# Patient Record
Sex: MALE | Race: WHITE | NOT HISPANIC OR LATINO | URBAN - METROPOLITAN AREA
[De-identification: names, ages, dates, MRNs, and addresses within clinical notes are randomized per-mention and may not be internally consistent; named-entity substitution may affect disease eponyms.]

---

## 2020-11-20 ENCOUNTER — NURSE TRIAGE (OUTPATIENT)
Dept: ADMINISTRATIVE | Facility: CLINIC | Age: 60
End: 2020-11-20

## 2020-11-21 ENCOUNTER — HOSPITAL ENCOUNTER (INPATIENT)
Facility: HOSPITAL | Age: 60
LOS: 2 days | Discharge: HOME | DRG: 177 | End: 2020-11-24
Attending: EMERGENCY MEDICINE | Admitting: INTERNAL MEDICINE
Payer: COMMERCIAL

## 2020-11-21 DIAGNOSIS — Z20.822 SUSPECTED COVID-19 VIRUS INFECTION: ICD-10-CM

## 2020-11-21 DIAGNOSIS — R00.1 BRADYCARDIA: ICD-10-CM

## 2020-11-21 DIAGNOSIS — J96.01 ACUTE HYPOXEMIC RESPIRATORY FAILURE: ICD-10-CM

## 2020-11-21 DIAGNOSIS — U07.1 PNEUMONIA DUE TO COVID-19 VIRUS: Primary | ICD-10-CM

## 2020-11-21 DIAGNOSIS — J12.82 PNEUMONIA DUE TO COVID-19 VIRUS: Primary | ICD-10-CM

## 2020-11-21 PROBLEM — E11.9 TYPE 2 DIABETES MELLITUS WITHOUT COMPLICATION: Status: ACTIVE | Noted: 2020-11-21

## 2020-11-21 PROBLEM — I10 HYPERTENSION: Status: ACTIVE | Noted: 2020-11-21

## 2020-11-21 PROBLEM — I10 ESSENTIAL HYPERTENSION: Chronic | Status: ACTIVE | Noted: 2020-11-21

## 2020-11-21 PROBLEM — M10.9 GOUT: Status: ACTIVE | Noted: 2020-11-21

## 2020-11-21 PROBLEM — M19.90 ARTHRITIS: Status: ACTIVE | Noted: 2020-11-21

## 2020-11-21 PROBLEM — E11.9 TYPE 2 DIABETES MELLITUS WITHOUT COMPLICATION, WITHOUT LONG-TERM CURRENT USE OF INSULIN: Chronic | Status: ACTIVE | Noted: 2020-11-21

## 2020-11-21 PROBLEM — E11.65 TYPE 2 DIABETES MELLITUS WITH HYPERGLYCEMIA, WITHOUT LONG-TERM CURRENT USE OF INSULIN: Chronic | Status: ACTIVE | Noted: 2020-11-21

## 2020-11-21 LAB
ALBUMIN SERPL BCP-MCNC: 2.4 G/DL (ref 3.5–5.2)
ALLENS TEST: ABNORMAL
ALP SERPL-CCNC: 104 U/L (ref 55–135)
ALT SERPL W/O P-5'-P-CCNC: 123 U/L (ref 10–44)
ANION GAP SERPL CALC-SCNC: 12 MMOL/L (ref 8–16)
AST SERPL-CCNC: 79 U/L (ref 10–40)
BASOPHILS # BLD AUTO: 0 K/UL (ref 0–0.2)
BASOPHILS NFR BLD: 0 % (ref 0–1.9)
BILIRUB SERPL-MCNC: 0.5 MG/DL (ref 0.1–1)
BUN SERPL-MCNC: 20 MG/DL (ref 8–23)
CALCIUM SERPL-MCNC: 8.4 MG/DL (ref 8.7–10.5)
CHLORIDE SERPL-SCNC: 101 MMOL/L (ref 95–110)
CK SERPL-CCNC: 68 U/L (ref 20–200)
CO2 SERPL-SCNC: 24 MMOL/L (ref 23–29)
CREAT SERPL-MCNC: 1 MG/DL (ref 0.5–1.4)
CRP SERPL-MCNC: 133.7 MG/L (ref 0–8.2)
D DIMER PPP IA.FEU-MCNC: 0.69 MG/L FEU
DELSYS: ABNORMAL
DIFFERENTIAL METHOD: ABNORMAL
EOSINOPHIL # BLD AUTO: 0 K/UL (ref 0–0.5)
EOSINOPHIL NFR BLD: 0 % (ref 0–8)
ERYTHROCYTE [DISTWIDTH] IN BLOOD BY AUTOMATED COUNT: 12 % (ref 11.5–14.5)
ERYTHROCYTE [SEDIMENTATION RATE] IN BLOOD BY WESTERGREN METHOD: 120 MM/HR (ref 0–10)
EST. GFR  (AFRICAN AMERICAN): >60 ML/MIN/1.73 M^2
EST. GFR  (NON AFRICAN AMERICAN): >60 ML/MIN/1.73 M^2
FIO2: 21
GLUCOSE SERPL-MCNC: 336 MG/DL (ref 70–110)
HCO3 UR-SCNC: 23.6 MMOL/L (ref 24–28)
HCT VFR BLD AUTO: 35.4 % (ref 40–54)
HGB BLD-MCNC: 12.1 G/DL (ref 14–18)
IMM GRANULOCYTES # BLD AUTO: 0.05 K/UL (ref 0–0.04)
IMM GRANULOCYTES NFR BLD AUTO: 0.6 % (ref 0–0.5)
LACTATE SERPL-SCNC: 1.5 MMOL/L (ref 0.5–2.2)
LACTATE SERPL-SCNC: 2.3 MMOL/L (ref 0.5–2.2)
LDH SERPL L TO P-CCNC: 204 U/L (ref 110–260)
LDH SERPL L TO P-CCNC: 267 U/L (ref 110–260)
LYMPHOCYTES # BLD AUTO: 0.8 K/UL (ref 1–4.8)
LYMPHOCYTES NFR BLD: 8.7 % (ref 18–48)
MCH RBC QN AUTO: 32.5 PG (ref 27–31)
MCHC RBC AUTO-ENTMCNC: 34.2 G/DL (ref 32–36)
MCV RBC AUTO: 95 FL (ref 82–98)
MODE: ABNORMAL
MONOCYTES # BLD AUTO: 0.8 K/UL (ref 0.3–1)
MONOCYTES NFR BLD: 8.6 % (ref 4–15)
NEUTROPHILS # BLD AUTO: 7.3 K/UL (ref 1.8–7.7)
NEUTROPHILS NFR BLD: 82.1 % (ref 38–73)
NRBC BLD-RTO: 0 /100 WBC
PCO2 BLDA: 33.9 MMHG (ref 35–45)
PH SMN: 7.45 [PH] (ref 7.35–7.45)
PLATELET # BLD AUTO: 292 K/UL (ref 150–350)
PMV BLD AUTO: 9.2 FL (ref 9.2–12.9)
PO2 BLDA: 60 MMHG (ref 80–100)
POC BE: 0 MMOL/L
POC SATURATED O2: 92 % (ref 95–100)
POTASSIUM SERPL-SCNC: 4 MMOL/L (ref 3.5–5.1)
PROCALCITONIN SERPL IA-MCNC: 0.13 NG/ML
PROT SERPL-MCNC: 7 G/DL (ref 6–8.4)
RBC # BLD AUTO: 3.72 M/UL (ref 4.6–6.2)
SAMPLE: ABNORMAL
SITE: ABNORMAL
SODIUM SERPL-SCNC: 137 MMOL/L (ref 136–145)
TROPONIN I SERPL DL<=0.01 NG/ML-MCNC: 0.01 NG/ML (ref 0–0.03)
WBC # BLD AUTO: 8.86 K/UL (ref 3.9–12.7)

## 2020-11-21 PROCEDURE — 83605 ASSAY OF LACTIC ACID: CPT | Mod: ER

## 2020-11-21 PROCEDURE — 36600 WITHDRAWAL OF ARTERIAL BLOOD: CPT | Mod: ER

## 2020-11-21 PROCEDURE — 82728 ASSAY OF FERRITIN: CPT | Mod: 91

## 2020-11-21 PROCEDURE — 82728 ASSAY OF FERRITIN: CPT

## 2020-11-21 PROCEDURE — 93010 EKG 12-LEAD: ICD-10-PCS | Mod: ,,, | Performed by: INTERNAL MEDICINE

## 2020-11-21 PROCEDURE — 94761 N-INVAS EAR/PLS OXIMETRY MLT: CPT

## 2020-11-21 PROCEDURE — 36415 COLL VENOUS BLD VENIPUNCTURE: CPT

## 2020-11-21 PROCEDURE — 82550 ASSAY OF CK (CPK): CPT | Mod: ER

## 2020-11-21 PROCEDURE — 84484 ASSAY OF TROPONIN QUANT: CPT | Mod: ER

## 2020-11-21 PROCEDURE — 83615 LACTATE (LD) (LDH) ENZYME: CPT | Mod: ER

## 2020-11-21 PROCEDURE — G0378 HOSPITAL OBSERVATION PER HR: HCPCS | Mod: ER

## 2020-11-21 PROCEDURE — 25000003 PHARM REV CODE 250: Performed by: INTERNAL MEDICINE

## 2020-11-21 PROCEDURE — 93005 ELECTROCARDIOGRAM TRACING: CPT | Mod: ER

## 2020-11-21 PROCEDURE — 94640 AIRWAY INHALATION TREATMENT: CPT

## 2020-11-21 PROCEDURE — 87040 BLOOD CULTURE FOR BACTERIA: CPT | Mod: 59

## 2020-11-21 PROCEDURE — 63600175 PHARM REV CODE 636 W HCPCS: Performed by: INTERNAL MEDICINE

## 2020-11-21 PROCEDURE — 83605 ASSAY OF LACTIC ACID: CPT | Mod: 91

## 2020-11-21 PROCEDURE — 99900035 HC TECH TIME PER 15 MIN (STAT)

## 2020-11-21 PROCEDURE — 93010 ELECTROCARDIOGRAM REPORT: CPT | Mod: ,,, | Performed by: INTERNAL MEDICINE

## 2020-11-21 PROCEDURE — 99900035 HC TECH TIME PER 15 MIN (STAT): Mod: ER

## 2020-11-21 PROCEDURE — 82803 BLOOD GASES ANY COMBINATION: CPT | Mod: ER

## 2020-11-21 PROCEDURE — 25000003 PHARM REV CODE 250: Performed by: EMERGENCY MEDICINE

## 2020-11-21 PROCEDURE — 99291 CRITICAL CARE FIRST HOUR: CPT | Mod: 25,ER

## 2020-11-21 PROCEDURE — G0378 HOSPITAL OBSERVATION PER HR: HCPCS

## 2020-11-21 PROCEDURE — 96374 THER/PROPH/DIAG INJ IV PUSH: CPT | Mod: ER

## 2020-11-21 PROCEDURE — 25000242 PHARM REV CODE 250 ALT 637 W/ HCPCS: Performed by: INTERNAL MEDICINE

## 2020-11-21 PROCEDURE — 80053 COMPREHEN METABOLIC PANEL: CPT | Mod: ER

## 2020-11-21 PROCEDURE — 96372 THER/PROPH/DIAG INJ SC/IM: CPT

## 2020-11-21 PROCEDURE — 63600175 PHARM REV CODE 636 W HCPCS: Mod: ER | Performed by: EMERGENCY MEDICINE

## 2020-11-21 PROCEDURE — 85379 FIBRIN DEGRADATION QUANT: CPT

## 2020-11-21 PROCEDURE — 82306 VITAMIN D 25 HYDROXY: CPT

## 2020-11-21 PROCEDURE — 83615 LACTATE (LD) (LDH) ENZYME: CPT | Mod: 91

## 2020-11-21 PROCEDURE — 94799 UNLISTED PULMONARY SVC/PX: CPT

## 2020-11-21 PROCEDURE — 84145 PROCALCITONIN (PCT): CPT

## 2020-11-21 PROCEDURE — 85025 COMPLETE CBC W/AUTO DIFF WBC: CPT | Mod: ER

## 2020-11-21 PROCEDURE — 85651 RBC SED RATE NONAUTOMATED: CPT

## 2020-11-21 PROCEDURE — 86140 C-REACTIVE PROTEIN: CPT | Mod: ER

## 2020-11-21 RX ORDER — METFORMIN HYDROCHLORIDE 1000 MG/1
1000 TABLET ORAL
COMMUNITY
Start: 2020-02-27

## 2020-11-21 RX ORDER — LANOLIN ALCOHOL/MO/W.PET/CERES
1000 CREAM (GRAM) TOPICAL
COMMUNITY

## 2020-11-21 RX ORDER — ATORVASTATIN CALCIUM 20 MG/1
20 TABLET, FILM COATED ORAL
COMMUNITY
Start: 2019-12-12

## 2020-11-21 RX ORDER — DEXAMETHASONE SODIUM PHOSPHATE 4 MG/ML
6 INJECTION, SOLUTION INTRA-ARTICULAR; INTRALESIONAL; INTRAMUSCULAR; INTRAVENOUS; SOFT TISSUE
Status: COMPLETED | OUTPATIENT
Start: 2020-11-21 | End: 2020-11-21

## 2020-11-21 RX ORDER — GLUCAGON 1 MG
1 KIT INJECTION
Status: DISCONTINUED | OUTPATIENT
Start: 2020-11-21 | End: 2020-11-23

## 2020-11-21 RX ORDER — SODIUM CHLORIDE 0.9 % (FLUSH) 0.9 %
10 SYRINGE (ML) INJECTION
Status: DISCONTINUED | OUTPATIENT
Start: 2020-11-21 | End: 2020-11-24 | Stop reason: HOSPADM

## 2020-11-21 RX ORDER — ALLOPURINOL 300 MG/1
300 TABLET ORAL
COMMUNITY
Start: 2019-12-30 | End: 2020-12-29

## 2020-11-21 RX ORDER — TALC
6 POWDER (GRAM) TOPICAL NIGHTLY PRN
Status: DISCONTINUED | OUTPATIENT
Start: 2020-11-21 | End: 2020-11-22

## 2020-11-21 RX ORDER — ASCORBIC ACID 500 MG
500 TABLET ORAL 2 TIMES DAILY
Status: DISCONTINUED | OUTPATIENT
Start: 2020-11-21 | End: 2020-11-24 | Stop reason: HOSPADM

## 2020-11-21 RX ORDER — ATORVASTATIN CALCIUM 10 MG/1
20 TABLET, FILM COATED ORAL DAILY
Status: DISCONTINUED | OUTPATIENT
Start: 2020-11-22 | End: 2020-11-21

## 2020-11-21 RX ORDER — ALPRAZOLAM 0.25 MG/1
0.25 TABLET ORAL 2 TIMES DAILY PRN
Status: DISCONTINUED | OUTPATIENT
Start: 2020-11-21 | End: 2020-11-24 | Stop reason: HOSPADM

## 2020-11-21 RX ORDER — INSULIN ASPART 100 [IU]/ML
0-5 INJECTION, SOLUTION INTRAVENOUS; SUBCUTANEOUS EVERY 6 HOURS PRN
Status: DISCONTINUED | OUTPATIENT
Start: 2020-11-22 | End: 2020-11-23

## 2020-11-21 RX ORDER — DIPHENHYDRAMINE HCL 25 MG
25 CAPSULE ORAL EVERY 6 HOURS PRN
Status: DISCONTINUED | OUTPATIENT
Start: 2020-11-21 | End: 2020-11-24 | Stop reason: HOSPADM

## 2020-11-21 RX ORDER — GUAIFENESIN/DEXTROMETHORPHAN 100-10MG/5
10 SYRUP ORAL EVERY 4 HOURS PRN
Status: DISCONTINUED | OUTPATIENT
Start: 2020-11-21 | End: 2020-11-24 | Stop reason: HOSPADM

## 2020-11-21 RX ORDER — IBUPROFEN 200 MG
24 TABLET ORAL
Status: DISCONTINUED | OUTPATIENT
Start: 2020-11-21 | End: 2020-11-23

## 2020-11-21 RX ORDER — ACETAMINOPHEN 325 MG/1
650 TABLET ORAL EVERY 6 HOURS PRN
Status: DISCONTINUED | OUTPATIENT
Start: 2020-11-21 | End: 2020-11-21

## 2020-11-21 RX ORDER — IBUPROFEN 400 MG/1
400 TABLET ORAL EVERY 6 HOURS PRN
Status: DISCONTINUED | OUTPATIENT
Start: 2020-11-22 | End: 2020-11-24 | Stop reason: HOSPADM

## 2020-11-21 RX ORDER — ENOXAPARIN SODIUM 100 MG/ML
40 INJECTION SUBCUTANEOUS EVERY 24 HOURS
Status: DISCONTINUED | OUTPATIENT
Start: 2020-11-21 | End: 2020-11-24 | Stop reason: HOSPADM

## 2020-11-21 RX ORDER — IBUPROFEN 200 MG
16 TABLET ORAL
Status: DISCONTINUED | OUTPATIENT
Start: 2020-11-21 | End: 2020-11-23

## 2020-11-21 RX ORDER — ALBUTEROL SULFATE 90 UG/1
2 AEROSOL, METERED RESPIRATORY (INHALATION) EVERY 6 HOURS
Status: DISCONTINUED | OUTPATIENT
Start: 2020-11-21 | End: 2020-11-24 | Stop reason: HOSPADM

## 2020-11-21 RX ORDER — BISACODYL 10 MG
10 SUPPOSITORY, RECTAL RECTAL DAILY PRN
Status: DISCONTINUED | OUTPATIENT
Start: 2020-11-21 | End: 2020-11-24 | Stop reason: HOSPADM

## 2020-11-21 RX ORDER — ALPRAZOLAM 0.25 MG/1
0.25 TABLET ORAL
COMMUNITY
Start: 2020-11-19

## 2020-11-21 RX ORDER — CELECOXIB 200 MG/1
200 CAPSULE ORAL
COMMUNITY
Start: 2020-11-20

## 2020-11-21 RX ORDER — CELECOXIB 100 MG/1
200 CAPSULE ORAL DAILY
Status: DISCONTINUED | OUTPATIENT
Start: 2020-11-22 | End: 2020-11-24 | Stop reason: HOSPADM

## 2020-11-21 RX ORDER — ZOLPIDEM TARTRATE 10 MG/1
10 TABLET ORAL
COMMUNITY
Start: 2020-11-19

## 2020-11-21 RX ORDER — INSULIN ASPART 100 [IU]/ML
0-5 INJECTION, SOLUTION INTRAVENOUS; SUBCUTANEOUS
Status: DISCONTINUED | OUTPATIENT
Start: 2020-11-21 | End: 2020-11-21

## 2020-11-21 RX ORDER — CHOLECALCIFEROL (VITAMIN D3) 25 MCG
1000 TABLET ORAL DAILY
Status: DISCONTINUED | OUTPATIENT
Start: 2020-11-22 | End: 2020-11-24 | Stop reason: HOSPADM

## 2020-11-21 RX ORDER — ONDANSETRON 2 MG/ML
4 INJECTION INTRAMUSCULAR; INTRAVENOUS EVERY 8 HOURS PRN
Status: DISCONTINUED | OUTPATIENT
Start: 2020-11-21 | End: 2020-11-24 | Stop reason: HOSPADM

## 2020-11-21 RX ORDER — ALLOPURINOL 300 MG/1
300 TABLET ORAL DAILY
Status: DISCONTINUED | OUTPATIENT
Start: 2020-11-22 | End: 2020-11-24 | Stop reason: HOSPADM

## 2020-11-21 RX ORDER — CYANOCOBALAMIN (VITAMIN B-12) 250 MCG
1000 TABLET ORAL DAILY
Status: DISCONTINUED | OUTPATIENT
Start: 2020-11-22 | End: 2020-11-24 | Stop reason: HOSPADM

## 2020-11-21 RX ORDER — ZINC SULFATE 50(220)MG
220 CAPSULE ORAL DAILY
Status: DISCONTINUED | OUTPATIENT
Start: 2020-11-22 | End: 2020-11-24 | Stop reason: HOSPADM

## 2020-11-21 RX ADMIN — OXYCODONE HYDROCHLORIDE AND ACETAMINOPHEN 500 MG: 500 TABLET ORAL at 09:11

## 2020-11-21 RX ADMIN — Medication 6 MG: at 10:11

## 2020-11-21 RX ADMIN — ALPRAZOLAM 0.25 MG: 0.25 TABLET ORAL at 10:11

## 2020-11-21 RX ADMIN — ALBUTEROL SULFATE 2 PUFF: 90 AEROSOL, METERED RESPIRATORY (INHALATION) at 10:11

## 2020-11-21 RX ADMIN — DEXAMETHASONE SODIUM PHOSPHATE 6 MG: 4 INJECTION, SOLUTION INTRAMUSCULAR; INTRAVENOUS at 03:11

## 2020-11-21 RX ADMIN — ENOXAPARIN SODIUM 40 MG: 100 INJECTION SUBCUTANEOUS at 09:11

## 2020-11-21 NOTE — ED PROVIDER NOTES
Encounter Date: 11/21/2020       History     Chief Complaint   Patient presents with    Shortness of Breath     sob worse today and low sat at home in 70's . hx covid     61 y/o M with PMH of HTN, DM, HLD here with c/o SOB that has been progressively worsening over the past 6 days, and is now severe. Patient was dx with COVID 3 days ago, and has been taking prednisone, and using a home pulse ox. Today, he was having trouble speaking, and had sats in the 70's at home. He is feeling better on our oxygen here.         Review of patient's allergies indicates:  No Known Allergies  Past Medical History:   Diagnosis Date    Diabetes mellitus     High cholesterol     Hypertension      History reviewed. No pertinent surgical history.  History reviewed. No pertinent family history.  Social History     Tobacco Use    Smoking status: Never Smoker    Smokeless tobacco: Never Used   Substance Use Topics    Alcohol use: Yes    Drug use: Never     Review of Systems   Constitutional: Positive for chills and fever.   HENT: Negative for congestion and dental problem.    Eyes: Negative for pain and visual disturbance.   Respiratory: Positive for cough and shortness of breath.    Cardiovascular: Negative for chest pain and palpitations.   Gastrointestinal: Negative for abdominal pain, diarrhea, nausea and vomiting.   Genitourinary: Negative for dysuria and flank pain.   Musculoskeletal: Negative for back pain and neck pain.   Skin: Negative for rash and wound.   Neurological: Negative for weakness, numbness and headaches.       Physical Exam     Initial Vitals   BP Pulse Resp Temp SpO2   11/21/20 1426 11/21/20 1426 11/21/20 1426 11/21/20 1430 11/21/20 1426   (!) 155/72 69 (!) 36 99.9 °F (37.7 °C) 97 %      MAP       --                Physical Exam    Constitutional: He appears well-developed and well-nourished. No distress.   HENT:   Head: Normocephalic and atraumatic.   Eyes: EOM are normal. Pupils are equal, round, and  reactive to light.   Neck: Normal range of motion. Neck supple.   Cardiovascular: Normal rate and regular rhythm.   Pulmonary/Chest: He is in respiratory distress. He has no wheezes. He has no rales.   Tachypnea   Abdominal: He exhibits no distension. There is no abdominal tenderness.   Musculoskeletal: Normal range of motion. No tenderness.   Neurological: He is alert and oriented to person, place, and time.   Skin: Skin is warm and dry.   Psychiatric: He has a normal mood and affect.         ED Course   Critical Care    Date/Time: 11/21/2020 3:15 PM  Performed by: Michael Alanis MD  Authorized by: Michael Alanis MD   Total critical care time (exclusive of procedural time) : 38 minutes  Critical care time was exclusive of separately billable procedures and treating other patients and teaching time.  Critical care was necessary to treat or prevent imminent or life-threatening deterioration of the following conditions: respiratory failure.  Critical care was time spent personally by me on the following activities: blood draw for specimens, discussions with consultants, evaluation of patient's response to treatment, obtaining history from patient or surrogate, ordering and review of laboratory studies, pulse oximetry, review of old charts, vascular access procedures, development of treatment plan with patient or surrogate, interpretation of cardiac output measurements, examination of patient, ordering and performing treatments and interventions, ordering and review of radiographic studies and re-evaluation of patient's condition.        Labs Reviewed   CBC W/ AUTO DIFFERENTIAL - Abnormal; Notable for the following components:       Result Value    RBC 3.72 (*)     Hemoglobin 12.1 (*)     Hematocrit 35.4 (*)     MCH 32.5 (*)     Immature Granulocytes 0.6 (*)     Immature Grans (Abs) 0.05 (*)     Lymph # 0.8 (*)     Gran % 82.1 (*)     Lymph % 8.7 (*)     All other components within normal limits   COMPREHENSIVE  METABOLIC PANEL - Abnormal; Notable for the following components:    Glucose 336 (*)     Calcium 8.4 (*)     Albumin 2.4 (*)     AST 79 (*)      (*)     All other components within normal limits   C-REACTIVE PROTEIN - Abnormal; Notable for the following components:    .7 (*)     All other components within normal limits   LACTIC ACID, PLASMA - Abnormal; Notable for the following components:    Lactate (Lactic Acid) 2.3 (*)     All other components within normal limits   ISTAT PROCEDURE - Abnormal; Notable for the following components:    POC PH 7.451 (*)     POC PCO2 33.9 (*)     POC PO2 60 (*)     POC HCO3 23.6 (*)     POC SATURATED O2 92 (*)     All other components within normal limits   LACTATE DEHYDROGENASE   CK   TROPONIN I   URINALYSIS, REFLEX TO URINE CULTURE     EKG Readings: (Independently Interpreted)   71 BPM. NSR. Normal axis. Normal pr, qrs, and qtc. No STEMI.  The     ECG Results          EKG 12-lead (Final result)  Result time 11/22/20 19:43:02    Final result by Interface, Lab In [facility] (11/22/20 19:43:02)                 Narrative:    Test Reason : Z20.828,    Vent. Rate : 071 BPM     Atrial Rate : 071 BPM     P-R Int : 150 ms          QRS Dur : 100 ms      QT Int : 412 ms       P-R-T Axes : 045 004 007 degrees     QTc Int : 447 ms    Normal sinus rhythm  Septal infarct ,age undetermined  Abnormal ECG  When compared with ECG of 19-NOV-2020 11:05,  Septal infarct is now Present  No significant change was found  Confirmed by ANGELITA ANDRADE MD ([id number]) on 11/22/2020 7:42:37 PM    Referred By: [email or web address]   SELF           Confirmed By:ANGELITA ANDRADE MD                            Imaging Results          X-Ray Chest AP Portable (Final result)  Result time 11/21/20 15:19:02    Final result by Hitesh Vu MD (11/21/20 15:19:02)                 Impression:      1.  The reticular and ground-glass opacities in the lung bases persist.  There is slightly lower lung volumes on the current study.   Negative for new pulmonary opacities.  Findings continue to indicate atypical viral pneumonia such as COVID-19 infection.    2.  Other stable findings as noted above.      Electronically signed by: Hitesh Vu MD  Date:    11/21/2020  Time:    15:19             Narrative:    EXAMINATION:  XR CHEST AP PORTABLE    CLINICAL HISTORY:  Suspected Covid-19 Virus Infection;    COMPARISON:  December 19, 2020    FINDINGS:  EKG leads overlie the chest which is lordotic in position and rotated to the left.  Progressively lower lung volumes, with similar degree of basilar interstitial changes and ground-glass opacities.  The lungs are free of new pulmonary opacities.  The hilar and mediastinal contours and osseous structures are unchanged.                                                   ED Course as of Nov 22 2045   Sat Nov 21, 2020   1522 [facility] is at capacity.     [BA]   1612 All historical, clinical, radiographic, and laboratory findings were reviewed with the patient/family in detail along with the indications for transport to the facility in [place] in order to receive hospitalist observation.  All remaining questions and concerns were addressed at this time and the patient/family communicates understanding and agrees to proceed accordingly.  Similarly, all pertinent details of the encounter were discussed with Dr. Gamino who agrees to receive the patient at [facility] for further care as outlined above.  The patient will be transferred by [facility] secondary to a need for ongoing cardiac monitoring en route.  Michael Alanis MD  4:12 PM          [BA]      ED Course User Index  [BA] Michael Alanis MD            Clinical Impression:       ICD-10-CM ICD-9-CM   1. Pneumonia due to COVID-19 virus  U07.1 480.8    J12.89    2. Acute hypoxemic respiratory failure  J96.01 518.81                      Disposition:   Condition: Stable     ED Disposition Condition    Observation                              Michael Alanis MD  11/22/20 2044

## 2020-11-21 NOTE — TELEPHONE ENCOUNTER
Reason for Disposition   Message left on unidentified voice mail.  Phone number verified.    Additional Information   Negative: Caller is angry or rude (e.g., hangs up, verbally abusive, yelling)   Negative: Caller hangs up   Negative: Caller has already spoken with the PCP and has no further questions.   Negative: Caller has already spoken with another triager and has no further questions.   Negative: Caller has already spoken with another triager or PCP AND has further questions AND triager able to answer questions.    Protocols used: NO CONTACT OR DUPLICATE CONTACT CALL-A-

## 2020-11-21 NOTE — TELEPHONE ENCOUNTER
Surveillance enrollment call 1st attempt. Pt contacted for Surveillance Program enrollment. No answer. Left VM that a provider has enrolled you into one of our programs. I a mcalling to give you some information about the program. Sorry that we missed you and someone will attempt to call you tomorrow.

## 2020-11-22 PROBLEM — R74.01 TRANSAMINITIS: Status: ACTIVE | Noted: 2020-11-22

## 2020-11-22 LAB
25(OH)D3+25(OH)D2 SERPL-MCNC: 13 NG/ML (ref 30–96)
ALBUMIN SERPL BCP-MCNC: 2.4 G/DL (ref 3.5–5.2)
ALP SERPL-CCNC: 117 U/L (ref 55–135)
ALT SERPL W/O P-5'-P-CCNC: 98 U/L (ref 10–44)
ANION GAP SERPL CALC-SCNC: 9 MMOL/L (ref 8–16)
AST SERPL-CCNC: 55 U/L (ref 10–40)
BASOPHILS # BLD AUTO: 0.01 K/UL (ref 0–0.2)
BASOPHILS NFR BLD: 0.1 % (ref 0–1.9)
BILIRUB SERPL-MCNC: 0.6 MG/DL (ref 0.1–1)
BUN SERPL-MCNC: 21 MG/DL (ref 8–23)
CALCIUM SERPL-MCNC: 9 MG/DL (ref 8.7–10.5)
CHLORIDE SERPL-SCNC: 103 MMOL/L (ref 95–110)
CO2 SERPL-SCNC: 25 MMOL/L (ref 23–29)
CREAT SERPL-MCNC: 1.1 MG/DL (ref 0.5–1.4)
DIFFERENTIAL METHOD: ABNORMAL
EOSINOPHIL # BLD AUTO: 0 K/UL (ref 0–0.5)
EOSINOPHIL NFR BLD: 0 % (ref 0–8)
ERYTHROCYTE [DISTWIDTH] IN BLOOD BY AUTOMATED COUNT: 11.9 % (ref 11.5–14.5)
EST. GFR  (AFRICAN AMERICAN): >60 ML/MIN/1.73 M^2
EST. GFR  (NON AFRICAN AMERICAN): >60 ML/MIN/1.73 M^2
ESTIMATED AVG GLUCOSE: 157 MG/DL (ref 68–131)
FERRITIN SERPL-MCNC: 3766 NG/ML (ref 20–300)
FERRITIN SERPL-MCNC: 4106 NG/ML (ref 20–300)
GLUCOSE SERPL-MCNC: 338 MG/DL (ref 70–110)
HBA1C MFR BLD HPLC: 7.1 % (ref 4–5.6)
HCT VFR BLD AUTO: 34.5 % (ref 40–54)
HGB BLD-MCNC: 11.5 G/DL (ref 14–18)
IMM GRANULOCYTES # BLD AUTO: 0.08 K/UL (ref 0–0.04)
IMM GRANULOCYTES NFR BLD AUTO: 0.8 % (ref 0–0.5)
LYMPHOCYTES # BLD AUTO: 0.7 K/UL (ref 1–4.8)
LYMPHOCYTES NFR BLD: 7.3 % (ref 18–48)
MAGNESIUM SERPL-MCNC: 1.9 MG/DL (ref 1.6–2.6)
MCH RBC QN AUTO: 31.6 PG (ref 27–31)
MCHC RBC AUTO-ENTMCNC: 33.3 G/DL (ref 32–36)
MCV RBC AUTO: 95 FL (ref 82–98)
MONOCYTES # BLD AUTO: 0.9 K/UL (ref 0.3–1)
MONOCYTES NFR BLD: 9 % (ref 4–15)
NEUTROPHILS # BLD AUTO: 8.1 K/UL (ref 1.8–7.7)
NEUTROPHILS NFR BLD: 82.8 % (ref 38–73)
NRBC BLD-RTO: 0 /100 WBC
PHOSPHATE SERPL-MCNC: 4 MG/DL (ref 2.7–4.5)
PLATELET # BLD AUTO: 323 K/UL (ref 150–350)
PMV BLD AUTO: 9.4 FL (ref 9.2–12.9)
POCT GLUCOSE: 324 MG/DL (ref 70–110)
POCT GLUCOSE: 359 MG/DL (ref 70–110)
POCT GLUCOSE: 381 MG/DL (ref 70–110)
POTASSIUM SERPL-SCNC: 4.6 MMOL/L (ref 3.5–5.1)
PROT SERPL-MCNC: 7 G/DL (ref 6–8.4)
RBC # BLD AUTO: 3.64 M/UL (ref 4.6–6.2)
SODIUM SERPL-SCNC: 137 MMOL/L (ref 136–145)
WBC # BLD AUTO: 9.78 K/UL (ref 3.9–12.7)

## 2020-11-22 PROCEDURE — 63600175 PHARM REV CODE 636 W HCPCS: Performed by: NURSE PRACTITIONER

## 2020-11-22 PROCEDURE — 36415 COLL VENOUS BLD VENIPUNCTURE: CPT

## 2020-11-22 PROCEDURE — 94761 N-INVAS EAR/PLS OXIMETRY MLT: CPT

## 2020-11-22 PROCEDURE — 83036 HEMOGLOBIN GLYCOSYLATED A1C: CPT

## 2020-11-22 PROCEDURE — 80053 COMPREHEN METABOLIC PANEL: CPT

## 2020-11-22 PROCEDURE — 85025 COMPLETE CBC W/AUTO DIFF WBC: CPT

## 2020-11-22 PROCEDURE — 83735 ASSAY OF MAGNESIUM: CPT

## 2020-11-22 PROCEDURE — 84100 ASSAY OF PHOSPHORUS: CPT

## 2020-11-22 PROCEDURE — 21400001 HC TELEMETRY ROOM

## 2020-11-22 PROCEDURE — 94640 AIRWAY INHALATION TREATMENT: CPT

## 2020-11-22 PROCEDURE — 25000003 PHARM REV CODE 250: Performed by: INTERNAL MEDICINE

## 2020-11-22 PROCEDURE — 99900035 HC TECH TIME PER 15 MIN (STAT)

## 2020-11-22 PROCEDURE — 27000221 HC OXYGEN, UP TO 24 HOURS

## 2020-11-22 PROCEDURE — 96372 THER/PROPH/DIAG INJ SC/IM: CPT

## 2020-11-22 PROCEDURE — 63600175 PHARM REV CODE 636 W HCPCS: Performed by: INTERNAL MEDICINE

## 2020-11-22 RX ORDER — ZOLPIDEM TARTRATE 5 MG/1
10 TABLET ORAL NIGHTLY PRN
Status: DISCONTINUED | OUTPATIENT
Start: 2020-11-22 | End: 2020-11-24 | Stop reason: HOSPADM

## 2020-11-22 RX ADMIN — ALBUTEROL SULFATE 2 PUFF: 90 AEROSOL, METERED RESPIRATORY (INHALATION) at 01:11

## 2020-11-22 RX ADMIN — ALBUTEROL SULFATE 2 PUFF: 90 AEROSOL, METERED RESPIRATORY (INHALATION) at 08:11

## 2020-11-22 RX ADMIN — CELECOXIB 200 MG: 100 CAPSULE ORAL at 08:11

## 2020-11-22 RX ADMIN — INSULIN ASPART 5 UNITS: 100 INJECTION, SOLUTION INTRAVENOUS; SUBCUTANEOUS at 05:11

## 2020-11-22 RX ADMIN — DEXAMETHASONE 6 MG: 4 TABLET ORAL at 08:11

## 2020-11-22 RX ADMIN — OXYCODONE HYDROCHLORIDE AND ACETAMINOPHEN 500 MG: 500 TABLET ORAL at 08:11

## 2020-11-22 RX ADMIN — ZINC SULFATE 220 MG (50 MG) CAPSULE 220 MG: CAPSULE at 08:11

## 2020-11-22 RX ADMIN — ALLOPURINOL 300 MG: 300 TABLET ORAL at 08:11

## 2020-11-22 RX ADMIN — CYANOCOBALAMIN TAB 250 MCG 1000 MCG: 250 TAB at 08:11

## 2020-11-22 RX ADMIN — ZOLPIDEM TARTRATE 10 MG: 5 TABLET ORAL at 08:11

## 2020-11-22 RX ADMIN — ALPRAZOLAM 0.25 MG: 0.25 TABLET ORAL at 08:11

## 2020-11-22 RX ADMIN — INSULIN ASPART 4 UNITS: 100 INJECTION, SOLUTION INTRAVENOUS; SUBCUTANEOUS at 10:11

## 2020-11-22 RX ADMIN — Medication 1000 UNITS: at 08:11

## 2020-11-22 RX ADMIN — REMDESIVIR 200 MG: 100 INJECTION, POWDER, LYOPHILIZED, FOR SOLUTION INTRAVENOUS at 04:11

## 2020-11-22 RX ADMIN — ENOXAPARIN SODIUM 40 MG: 100 INJECTION SUBCUTANEOUS at 04:11

## 2020-11-22 RX ADMIN — ALBUTEROL SULFATE 2 PUFF: 90 AEROSOL, METERED RESPIRATORY (INHALATION) at 07:11

## 2020-11-22 RX ADMIN — THERA TABS 1 TABLET: TAB at 08:11

## 2020-11-22 NOTE — ASSESSMENT & PLAN NOTE
- COVID-19 testing positive on 11/19/2020  - Infection Control notified   - O2 sat stable on 2L NC. Continue oxygen supplementation as needed to keep sat >92%.  - Albuterol MDI Q6.  - Dexamethasone 6 mg PO x 10 days.  - Will check procalcitonin and add empiric abx if elevated.  - MV, ascorbic acid, vitamin D, and zinc.  - Antitussives and antipyretics as needed  - Start Remdesivir     - Isolation:   - Airborne, Contact and Droplet Precautions  - Cohort patients into COVID units  - N95 mask, wear eye protection  - 20 second hand hygiene              - Limit visitors per hospital policy              - Consolidating lab draws, nursing care, provider visits, and interventions    - Diagnostics: (leukopenia, hyponatremia, hyperferritinemia, elevated troponin, elevated d-dimer, age, and comorbidities are significant predictors of poor clinical outcome)  CBC, CMP, Procalcitonin, Ferritin, CRP, LDH, Troponin, Blood Culture x2 and Portable CXR    - Management:  Supplemental O2 to maintain SpO2 >92%  Telemetry  Continuous/intermittent Pulse Ox  Albuterol treatment PRN

## 2020-11-22 NOTE — HPI
Mr. Kruse is a 61 yo male with a PMHx of HTN, HLD, DM II, and obesity, who presented to the ED with c/o SOB that has progressively worsened over the past 1 week. Associated nonproductive cough, fever and chills. Symptoms are constant and moderate in severity. No mitigating or exacerbating factors reported. Patient was dx with COVID 3 days ago, and has been taking prednisone, and using a home pulse ox. Today, he was having trouble speaking, and had sats in the 70's at home. Patient denies any CP, palpitations, wheezing, congestion, orthopnea, edema, ABD pain, N/V/D, dysuria, hematuria, back pain, HA, lightheadedness, dizziness, syncope, or weakness. Upon arrival in the ED, patient was hypoxic with O2 sat 88% on RA. He was placed on 2L NC, which improved O2 sat to >95%. Initial work-up showed: Normal WBC, lactic 2.3, glucose 336, normal kidney function, AST 79, , negative troponin, , CPK 68, ferritin 4106, DDimer 0.69, , COVID-19 positive. ABG with pH 7.451, pCO2 33.9, pO2 60, SaO2 92 on RA. CXR showed reticular and ground-glass opacities in the lung bases persist, slightly lower lung volumes on the current study from previous, negative for new pulmonary opacities, findings continue to indicate atypical viral pneumonia such as COVID-19 infection. IV dexamethasone 6 mg given in the ED. Hospital Medicine was contacted for admission and patient placed in Observation for COVID-19 PNA.  Patient is a Full [employer]. His wife is SDM.

## 2020-11-22 NOTE — HOSPITAL COURSE
Mr Kruse is a 60 year old male who presented to [facility] Emergency Room with increase shortness of breath over the pass week. Associated symptoms include cough, fever and chills. He has been diagnosis with COVID-19. Patient reports symptoms symptoms improved with oxygen. He has now been started on Remdesivir. As of 11/23, vital signs and stable. He experience some asymptomatic bradycardia in the 40-50's, troponin negative. Glucose elevated due to steroids. Today patient feeling well, vital signs and labs stable. Patient qualify for home oxygen, COVID 19 surveillance order for outpatient. Patient was seen, examined and deemed suitable for discharge.

## 2020-11-22 NOTE — ED NOTES
Report received from TRACI Vu.  Care assumed.  Pt resting quietly with eyes closed.  Denies needs.  Resp e/u.  Skin w/d.  C/L in reach.  Cardiac monitoring in progress.

## 2020-11-22 NOTE — PLAN OF CARE
No significant changes this shift. Remains on 2L NC. Patient verbalizes understanding of care. NADN. Will continue to monitor.

## 2020-11-22 NOTE — ED NOTES
Notified Dr Calderon that patient is requesting CPAP while in hospital and asking if he can use his home CPAP.

## 2020-11-22 NOTE — ASSESSMENT & PLAN NOTE
- AST 79, , TBili 0.5, alk phos 104.  - Avoid hepatotoxic agents.  - Hold home statin for now.  - Follow labs.

## 2020-11-22 NOTE — SUBJECTIVE & OBJECTIVE
Past Medical History:   Diagnosis Date    Diabetes mellitus     High cholesterol     Hypertension        History reviewed. No pertinent surgical history.    Review of patient's allergies indicates:  No Known Allergies    No current facility-administered medications on file prior to encounter.      Current Outpatient Medications on File Prior to Encounter   Medication Sig    allopurinoL (ZYLOPRIM) 300 MG tablet Take 300 mg by mouth.    ALPRAZolam (XANAX) 0.25 MG tablet Take 0.25 mg by mouth.    atorvastatin (LIPITOR) 20 MG tablet Take 20 mg by mouth.    celecoxib (CELEBREX) 200 MG capsule Take 200 mg by mouth.    metFORMIN (GLUCOPHAGE) 1000 MG tablet Take 1,000 mg by mouth.    zolpidem (AMBIEN) 10 mg Tab Take 10 mg by mouth.    cyanocobalamin (VITAMIN B-12) 1000 MCG tablet Take 1,000 mcg by mouth.    predniSONE (DELTASONE) 50 MG Tab Take 1 tablet (50 mg total) by mouth once daily. for 5 days    pulse oximeter (PULSE OXIMETER) device Use twice daily at 8 AM and 3 PM and record the value in [employer]hart as directed.     Family History     Reviewed and not pertinent.         Tobacco Use    Smoking status: Never Smoker    Smokeless tobacco: Never Used   Substance and Sexual Activity    Alcohol use: Yes    Drug use: Never    Sexual activity: Not on file     Review of Systems   Constitutional: Positive for chills and fever. Negative for diaphoresis and fatigue.   HENT: Negative for congestion, postnasal drip, rhinorrhea, sinus pressure and sore throat.    Respiratory: Positive for cough and shortness of breath. Negative for wheezing.    Cardiovascular: Negative for chest pain, palpitations and leg swelling.   Gastrointestinal: Negative for abdominal pain, diarrhea, nausea and vomiting.   Genitourinary: Negative for dysuria and hematuria.   Musculoskeletal: Negative for arthralgias, back pain and myalgias.   Skin: Negative for pallor and rash.   Neurological: Negative for dizziness, syncope, weakness,  light-headedness, numbness and headaches.   All other systems reviewed and are negative.    Objective:     Vital Signs (Most Recent):  Temp: 98.4 °F (36.9 °C) (11/21/20 2149)  Pulse: 66 (11/21/20 2201)  Resp: 20 (11/21/20 2201)  BP: (!) 153/75 (11/21/20 2149)  SpO2: 95 % (11/21/20 2201) Vital Signs (24h Range):  Temp:  [98.4 °F (36.9 °C)-99.9 °F (37.7 °C)] 98.4 °F (36.9 °C)  Pulse:  [62-72] 66  Resp:  [20-38] 20  SpO2:  [88 %-100 %] 95 %  BP: (143-163)/(72-87) 153/75     Weight: 112.5 kg (248 lb 0.3 oz)  Body mass index is 33.64 kg/m².    Physical Exam  Vitals signs and nursing note reviewed.   Constitutional:       General: He is awake. He is not in acute distress.     Appearance: Normal appearance. He is well-developed. He is not diaphoretic.   HENT:      Head: Normocephalic and atraumatic.   Eyes:      Conjunctiva/sclera: Conjunctivae normal.   Neck:      Musculoskeletal: Normal range of motion and neck supple.   Cardiovascular:      Rate and Rhythm: Normal rate and regular rhythm.  No extrasystoles are present.     Heart sounds: S1 normal and S2 normal. No murmur.   Pulmonary:      Effort: Pulmonary effort is normal. No tachypnea, accessory muscle usage or respiratory distress.      Breath sounds: Normal breath sounds and air entry. No wheezing, rhonchi or rales.   Abdominal:      General: Bowel sounds are normal. There is no distension.      Palpations: Abdomen is soft.      Tenderness: There is no abdominal tenderness.   Musculoskeletal: Normal range of motion.         General: No tenderness or deformity.      Right lower leg: No edema.      Left lower leg: No edema.   Skin:     General: Skin is warm and dry.      Capillary Refill: Capillary refill takes less than 2 seconds.      Findings: No erythema or rash.   Neurological:      General: No focal deficit present.      Mental Status: He is alert and oriented to person, place, and time.   Psychiatric:         Mood and Affect: Mood and affect normal.          Behavior: Behavior normal. Behavior is cooperative.             Significant Labs:  Results for orders placed or performed during the hospital encounter of 11/21/20   CBC auto differential   Result Value Ref Range    WBC 8.86 3.90 - 12.70 K/uL    RBC 3.72 (L) 4.60 - 6.20 M/uL    Hemoglobin 12.1 (L) 14.0 - 18.0 g/dL    Hematocrit 35.4 (L) 40.0 - 54.0 %    MCV 95 82 - 98 fL    MCH 32.5 (H) 27.0 - 31.0 pg    MCHC 34.2 32.0 - 36.0 g/dL    RDW 12.0 11.5 - 14.5 %    Platelets 292 150 - 350 K/uL    MPV 9.2 9.2 - 12.9 fL    Immature Granulocytes 0.6 (H) 0.0 - 0.5 %    Gran # (ANC) 7.3 1.8 - 7.7 K/uL    Immature Grans (Abs) 0.05 (H) 0.00 - 0.04 K/uL    Lymph # 0.8 (L) 1.0 - 4.8 K/uL    Mono # 0.8 0.3 - 1.0 K/uL    Eos # 0.0 0.0 - 0.5 K/uL    Baso # 0.00 0.00 - 0.20 K/uL    nRBC 0 0 /100 WBC    Gran % 82.1 (H) 38.0 - 73.0 %    Lymph % 8.7 (L) 18.0 - 48.0 %    Mono % 8.6 4.0 - 15.0 %    Eosinophil % 0.0 0.0 - 8.0 %    Basophil % 0.0 0.0 - 1.9 %    Differential Method Automated    Comprehensive metabolic panel   Result Value Ref Range    Sodium 137 136 - 145 mmol/L    Potassium 4.0 3.5 - 5.1 mmol/L    Chloride 101 95 - 110 mmol/L    CO2 24 23 - 29 mmol/L    Glucose 336 (H) 70 - 110 mg/dL    BUN 20 8 - 23 mg/dL    Creatinine 1.0 0.5 - 1.4 mg/dL    Calcium 8.4 (L) 8.7 - 10.5 mg/dL    Total Protein 7.0 6.0 - 8.4 g/dL    Albumin 2.4 (L) 3.5 - 5.2 g/dL    Total Bilirubin 0.5 0.1 - 1.0 mg/dL    Alkaline Phosphatase 104 55 - 135 U/L    AST 79 (H) 10 - 40 U/L     (H) 10 - 44 U/L    Anion Gap 12 8 - 16 mmol/L    eGFR if African American >60.0 >60 mL/min/1.73 m^2    eGFR if non African American >60.0 >60 mL/min/1.73 m^2   C-Reactive Protein   Result Value Ref Range    .7 (H) 0.0 - 8.2 mg/L   Lactate Dehydrogenase   Result Value Ref Range     110 - 260 U/L   CK   Result Value Ref Range    CPK 68 20 - 200 U/L   Lactic Acid, Plasma   Result Value Ref Range    Lactate (Lactic Acid) 2.3 (H) 0.5 - 2.2 mmol/L   Troponin I    Result Value Ref Range    Troponin I 0.007 0.000 - 0.026 ng/mL   Lactate Dehydrogenase   Result Value Ref Range     (H) 110 - 260 U/L   D dimer, quantitative   Result Value Ref Range    D-Dimer 0.69 (H) <0.50 mg/L FEU   Lactic Acid, Plasma   Result Value Ref Range    Lactate (Lactic Acid) 1.5 0.5 - 2.2 mmol/L   ISTAT PROCEDURE   Result Value Ref Range    POC PH 7.451 (H) 7.35 - 7.45    POC PCO2 33.9 (L) 35 - 45 mmHg    POC PO2 60 (L) 80 - 100 mmHg    POC HCO3 23.6 (L) 24 - 28 mmol/L    POC BE 0 -2 to 2 mmol/L    POC SATURATED O2 92 (L) 95 - 100 %    Sample ARTERIAL     Site RR     Allens Test Pass     DelSys Room Air     Mode SPONT     FiO2 21       All pertinent labs within the past 24 hours have been reviewed.    Significant Imaging:  Imaging Results          X-Ray Chest AP Portable (Final result)  Result time 11/21/20 15:19:02    Final result by Hitesh Vu MD (11/21/20 15:19:02)                 Impression:      1.  The reticular and ground-glass opacities in the lung bases persist.  There is slightly lower lung volumes on the current study.  Negative for new pulmonary opacities.  Findings continue to indicate atypical viral pneumonia such as COVID-19 infection.    2.  Other stable findings as noted above.      Electronically signed by: Hitesh Vu MD  Date:    11/21/2020  Time:    15:19             Narrative:    EXAMINATION:  XR CHEST AP PORTABLE    CLINICAL HISTORY:  Suspected Covid-19 Virus Infection;    COMPARISON:  December 19, 2020    FINDINGS:  EKG leads overlie the chest which is lordotic in position and rotated to the left.  Progressively lower lung volumes, with similar degree of basilar interstitial changes and ground-glass opacities.  The lungs are free of new pulmonary opacities.  The hilar and mediastinal contours and osseous structures are unchanged.                               I have reviewed all pertinent imaging results/findings within the past 24 hours.     Results for orders  placed or performed during the hospital encounter of 11/19/20   EKG 12-lead    Collection Time: 11/19/20 11:05 AM    Narrative    Test Reason : R06.02,    Vent. Rate : 069 BPM     Atrial Rate : 069 BPM     P-R Int : 152 ms          QRS Dur : 100 ms      QT Int : 418 ms       P-R-T Axes : 029 002 -05 degrees     QTc Int : 447 ms    Normal sinus rhythm  Cannot rule out Inferior infarct ,age undetermined  Abnormal ECG  No previous ECGs available  Confirmed by ANGELITA ANDRADE MD ([id number]) on 11/20/2020 4:01:10 PM    Referred By: SELVIN   SELF           Confirmed By:ANGELITA ANDRADE MD

## 2020-11-22 NOTE — H&P
[facility]  History & Physical    Patient Name: Vincenzo Kruse  MRN: [id number]  Admission Date: 11/21/2020  Attending Physician: Nino Mackey MD   Primary Care Provider: Primary Doctor No         Patient information was obtained from patient and ER records.     Subjective:     Principal Problem:Pneumonia due to COVID-19 virus    Chief Complaint:   Chief Complaint   Patient presents with    Shortness of Breath     sob worse today and low sat at home in 70's . hx covid        HPI: Mr. Kruse is a 59 yo male with a PMHx of HTN, HLD, DM II, and obesity, who presented to the ED with c/o SOB that has progressively worsened over the past 1 week. Associated nonproductive cough, fever and chills. Symptoms are constant and moderate in severity. No mitigating or exacerbating factors reported. Patient was dx with COVID 3 days ago, and has been taking prednisone, and using a home pulse ox. Today, he was having trouble speaking, and had sats in the 70's at home. Patient denies any CP, palpitations, wheezing, congestion, orthopnea, edema, ABD pain, N/V/D, dysuria, hematuria, back pain, HA, lightheadedness, dizziness, syncope, or weakness. Upon arrival in the ED, patient was hypoxic with O2 sat 88% on RA. He was placed on 2L NC, which improved O2 sat to >95%. Initial work-up showed: Normal WBC, lactic 2.3, glucose 336, normal kidney function, AST 79, , negative troponin, , CPK 68, ferritin 4106, DDimer 0.69, , COVID-19 positive. ABG with pH 7.451, pCO2 33.9, pO2 60, SaO2 92 on RA. CXR showed reticular and ground-glass opacities in the lung bases persist, slightly lower lung volumes on the current study from previous, negative for new pulmonary opacities, findings continue to indicate atypical viral pneumonia such as COVID-19 infection. IV dexamethasone 6 mg given in the ED. Hospital Medicine was contacted for admission and patient placed in Observation for COVID-19  PNA.  Patient is a Full Code. His wife is SDM.      Past Medical History:   Diagnosis Date    Diabetes mellitus     High cholesterol     Hypertension        History reviewed. No pertinent surgical history.    Review of patient's allergies indicates:  No Known Allergies    No current facility-administered medications on file prior to encounter.      Current Outpatient Medications on File Prior to Encounter   Medication Sig    allopurinoL (ZYLOPRIM) 300 MG tablet Take 300 mg by mouth.    ALPRAZolam (XANAX) 0.25 MG tablet Take 0.25 mg by mouth.    atorvastatin (LIPITOR) 20 MG tablet Take 20 mg by mouth.    celecoxib (CELEBREX) 200 MG capsule Take 200 mg by mouth.    metFORMIN (GLUCOPHAGE) 1000 MG tablet Take 1,000 mg by mouth.    zolpidem (AMBIEN) 10 mg Tab Take 10 mg by mouth.    cyanocobalamin (VITAMIN B-12) 1000 MCG tablet Take 1,000 mcg by mouth.    predniSONE (DELTASONE) 50 MG Tab Take 1 tablet (50 mg total) by mouth once daily. for 5 days    pulse oximeter (PULSE OXIMETER) device Use twice daily at 8 AM and 3 PM and record the value in [facility] as directed.     Family History     Reviewed and not pertinent.         Tobacco Use    Smoking status: Never Smoker    Smokeless tobacco: Never Used   Substance and Sexual Activity    Alcohol use: Yes    Drug use: Never    Sexual activity: Not on file     Review of Systems   Constitutional: Positive for chills and fever. Negative for diaphoresis and fatigue.   HENT: Negative for congestion, postnasal drip, rhinorrhea, sinus pressure and sore throat.    Respiratory: Positive for cough and shortness of breath. Negative for wheezing.    Cardiovascular: Negative for chest pain, palpitations and leg swelling.   Gastrointestinal: Negative for abdominal pain, diarrhea, nausea and vomiting.   Genitourinary: Negative for dysuria and hematuria.   Musculoskeletal: Negative for arthralgias, back pain and myalgias.   Skin: Negative for pallor and rash.   Neurological:  Negative for dizziness, syncope, weakness, light-headedness, numbness and headaches.   All other systems reviewed and are negative.    Objective:     Vital Signs (Most Recent):  Temp: 98.4 °F (36.9 °C) (11/21/20 2149)  Pulse: 66 (11/21/20 2201)  Resp: 20 (11/21/20 2201)  BP: (!) 153/75 (11/21/20 2149)  SpO2: 95 % (11/21/20 2201) Vital Signs (24h Range):  Temp:  [98.4 °F (36.9 °C)-99.9 °F (37.7 °C)] 98.4 °F (36.9 °C)  Pulse:  [62-72] 66  Resp:  [20-38] 20  SpO2:  [88 %-100 %] 95 %  BP: (143-163)/(72-87) 153/75     Weight: 112.5 kg (248 lb 0.3 oz)  Body mass index is 33.64 kg/m².    Physical Exam  Vitals signs and nursing note reviewed.   Constitutional:       General: He is awake. He is not in acute distress.     Appearance: Normal appearance. He is well-developed. He is not diaphoretic.   HENT:      Head: Normocephalic and atraumatic.   Eyes:      Conjunctiva/sclera: Conjunctivae normal.   Neck:      Musculoskeletal: Normal range of motion and neck supple.   Cardiovascular:      Rate and Rhythm: Normal rate and regular rhythm.  No extrasystoles are present.     Heart sounds: S1 normal and S2 normal. No murmur.   Pulmonary:      Effort: Pulmonary effort is normal. No tachypnea, accessory muscle usage or respiratory distress.      Breath sounds: Normal breath sounds and air entry. No wheezing, rhonchi or rales.   Abdominal:      General: Bowel sounds are normal. There is no distension.      Palpations: Abdomen is soft.      Tenderness: There is no abdominal tenderness.   Musculoskeletal: Normal range of motion.         General: No tenderness or deformity.      Right lower leg: No edema.      Left lower leg: No edema.   Skin:     General: Skin is warm and dry.      Capillary Refill: Capillary refill takes less than 2 seconds.      Findings: No erythema or rash.   Neurological:      General: No focal deficit present.      Mental Status: He is alert and oriented to person, place, and time.   Psychiatric:         Mood and  Affect: Mood and affect normal.         Behavior: Behavior normal. Behavior is cooperative.             Significant Labs:  Results for orders placed or performed during the hospital encounter of 11/21/20   CBC auto differential   Result Value Ref Range    WBC 8.86 3.90 - 12.70 K/uL    RBC 3.72 (L) 4.60 - 6.20 M/uL    Hemoglobin 12.1 (L) 14.0 - 18.0 g/dL    Hematocrit 35.4 (L) 40.0 - 54.0 %    MCV 95 82 - 98 fL    MCH 32.5 (H) 27.0 - 31.0 pg    MCHC 34.2 32.0 - 36.0 g/dL    RDW 12.0 11.5 - 14.5 %    Platelets 292 150 - 350 K/uL    MPV 9.2 9.2 - 12.9 fL    Immature Granulocytes 0.6 (H) 0.0 - 0.5 %    Gran # (ANC) 7.3 1.8 - 7.7 K/uL    Immature Grans (Abs) 0.05 (H) 0.00 - 0.04 K/uL    Lymph # 0.8 (L) 1.0 - 4.8 K/uL    Mono # 0.8 0.3 - 1.0 K/uL    Eos # 0.0 0.0 - 0.5 K/uL    Baso # 0.00 0.00 - 0.20 K/uL    nRBC 0 0 /100 WBC    Gran % 82.1 (H) 38.0 - 73.0 %    Lymph % 8.7 (L) 18.0 - 48.0 %    Mono % 8.6 4.0 - 15.0 %    Eosinophil % 0.0 0.0 - 8.0 %    Basophil % 0.0 0.0 - 1.9 %    Differential Method Automated    Comprehensive metabolic panel   Result Value Ref Range    Sodium 137 136 - 145 mmol/L    Potassium 4.0 3.5 - 5.1 mmol/L    Chloride 101 95 - 110 mmol/L    CO2 24 23 - 29 mmol/L    Glucose 336 (H) 70 - 110 mg/dL    BUN 20 8 - 23 mg/dL    Creatinine 1.0 0.5 - 1.4 mg/dL    Calcium 8.4 (L) 8.7 - 10.5 mg/dL    Total Protein 7.0 6.0 - 8.4 g/dL    Albumin 2.4 (L) 3.5 - 5.2 g/dL    Total Bilirubin 0.5 0.1 - 1.0 mg/dL    Alkaline Phosphatase 104 55 - 135 U/L    AST 79 (H) 10 - 40 U/L     (H) 10 - 44 U/L    Anion Gap 12 8 - 16 mmol/L    eGFR if African American >60.0 >60 mL/min/1.73 m^2    eGFR if non African American >60.0 >60 mL/min/1.73 m^2   C-Reactive Protein   Result Value Ref Range    .7 (H) 0.0 - 8.2 mg/L   Lactate Dehydrogenase   Result Value Ref Range     110 - 260 U/L   CK   Result Value Ref Range    CPK 68 20 - 200 U/L   Lactic Acid, Plasma   Result Value Ref Range    Lactate (Lactic Acid)  2.3 (H) 0.5 - 2.2 mmol/L   Troponin I   Result Value Ref Range    Troponin I 0.007 0.000 - 0.026 ng/mL   Lactate Dehydrogenase   Result Value Ref Range     (H) 110 - 260 U/L   D dimer, quantitative   Result Value Ref Range    D-Dimer 0.69 (H) <0.50 mg/L FEU   Lactic Acid, Plasma   Result Value Ref Range    Lactate (Lactic Acid) 1.5 0.5 - 2.2 mmol/L   ISTAT PROCEDURE   Result Value Ref Range    POC PH 7.451 (H) 7.35 - 7.45    POC PCO2 33.9 (L) 35 - 45 mmHg    POC PO2 60 (L) 80 - 100 mmHg    POC HCO3 23.6 (L) 24 - 28 mmol/L    POC BE 0 -2 to 2 mmol/L    POC SATURATED O2 92 (L) 95 - 100 %    Sample ARTERIAL     Site RR     Allens Test Pass     DelSys Room Air     Mode SPONT     FiO2 21       All pertinent labs within the past 24 hours have been reviewed.    Significant Imaging:  Imaging Results          X-Ray Chest AP Portable (Final result)  Result time 11/21/20 15:19:02    Final result by Hitesh Vu MD (11/21/20 15:19:02)                 Impression:      1.  The reticular and ground-glass opacities in the lung bases persist.  There is slightly lower lung volumes on the current study.  Negative for new pulmonary opacities.  Findings continue to indicate atypical viral pneumonia such as COVID-19 infection.    2.  Other stable findings as noted above.      Electronically signed by: Hitesh Vu MD  Date:    11/21/2020  Time:    15:19             Narrative:    EXAMINATION:  XR CHEST AP PORTABLE    CLINICAL HISTORY:  Suspected Covid-19 Virus Infection;    COMPARISON:  December 19, 2020    FINDINGS:  EKG leads overlie the chest which is lordotic in position and rotated to the left.  Progressively lower lung volumes, with similar degree of basilar interstitial changes and ground-glass opacities.  The lungs are free of new pulmonary opacities.  The hilar and mediastinal contours and osseous structures are unchanged.                               I have reviewed all pertinent imaging results/findings within the  past 24 hours.     Results for orders placed or performed during the hospital encounter of 11/19/20   EKG 12-lead    Collection Time: 11/19/20 11:05 AM    Narrative    Test Reason : R06.02,    Vent. Rate : 069 BPM     Atrial Rate : 069 BPM     P-R Int : 152 ms          QRS Dur : 100 ms      QT Int : 418 ms       P-R-T Axes : 029 002 -05 degrees     QTc Int : 447 ms    Normal sinus rhythm  Cannot rule out Inferior infarct ,age undetermined  Abnormal ECG  No previous ECGs available  Confirmed by ANGELITA ANDRADE MD ([id number]) on 11/20/2020 4:01:10 PM    Referred By: SELVIN   SELF           Confirmed By:ANGELITA ANDRADE MD             Assessment/Plan:     * Acute respiratory failure with hypoxia secondary to pneumonia due to COVID-19 virus  - COVID-19 testing positive on 11/19/2020  - Infection Control notified   - O2 sat stable on 2L NC. Continue oxygen supplementation as needed to keep sat >92%.  - Albuterol MDI Q6.  - Dexamethasone 6 mg PO x 10 days.  - Will check procalcitonin and add empiric abx if elevated.  - MV, ascorbic acid, vitamin D, and zinc.  - Antitussives and antipyretics as needed    - Isolation:   - Airborne, Contact and Droplet Precautions  - Cohort patients into COVID units  - N95 mask, wear eye protection  - 20 second hand hygiene              - Limit visitors per hospital policy              - Consolidating lab draws, nursing care, provider visits, and interventions    - Diagnostics: (leukopenia, hyponatremia, hyperferritinemia, elevated troponin, elevated d-dimer, age, and comorbidities are significant predictors of poor clinical outcome)  CBC, CMP, Procalcitonin, Ferritin, CRP, LDH, Troponin, Blood Culture x2 and Portable CXR    - Management:  Supplemental O2 to maintain SpO2 >92%  Telemetry  Continuous/intermittent Pulse Ox  Albuterol treatment PRN    Transaminitis  - AST 79, , TBili 0.5, alk phos 104.  - Avoid hepatotoxic agents.  - Hold home statin for now.  - Follow labs.    Hyperlipidemia  -  Statin on hold due to elevated LFTs, resume once stable.    Essential hypertension  - Diet controlled at home.  - Clonidine PRN.  - Low sodium diet.    Type 2 diabetes mellitus with hyperglycemia, without long-term current use of insulin  - Hold metformin during hospital stay.  - AccuChecks with low dose SSI, will monitor Q6 hours while on steroids.  - Diabetic diet.  - Check HbA1c.      VTE Risk Mitigation (From admission, onward)         Ordered     enoxaparin injection 40 mg  Every 24 hours      11/21/20 1906     IP VTE HIGH RISK PATIENT  Once      11/21/20 1906     Place sequential compression device  Until discontinued      11/21/20 1906                   Bridget Erwin NP  Department of Hospital Medicine   [facility]

## 2020-11-22 NOTE — SUBJECTIVE & OBJECTIVE
Interval History:     Review of Systems   Constitutional: Positive for chills and fever. Negative for diaphoresis and fatigue.   HENT: Negative for congestion, postnasal drip, rhinorrhea, sinus pressure and sore throat.    Respiratory: Positive for cough and shortness of breath. Negative for wheezing.    Cardiovascular: Negative for chest pain, palpitations and leg swelling.   Gastrointestinal: Negative for abdominal pain, diarrhea, nausea and vomiting.   Genitourinary: Negative for dysuria and hematuria.   Musculoskeletal: Negative for arthralgias, back pain and myalgias.   Skin: Negative for pallor and rash.   Neurological: Positive for weakness. Negative for dizziness, syncope, light-headedness, numbness and headaches.   All other systems reviewed and are negative.    Objective:     Vital Signs (Most Recent):  Temp: 97.4 °F (36.3 °C) (11/22/20 1617)  Pulse: 60 (11/22/20 1617)  Resp: (!) 22 (11/22/20 1617)  BP: (!) 160/82 (11/22/20 1617)  SpO2: (!) 93 % (11/22/20 1617) Vital Signs (24h Range):  Temp:  [97.2 °F (36.2 °C)-98.7 °F (37.1 °C)] 97.4 °F (36.3 °C)  Pulse:  [50-75] 60  Resp:  [18-33] 22  SpO2:  [92 %-95 %] 93 %  BP: (143-172)/(75-87) 160/82     Weight: 112.6 kg (248 lb 3.8 oz)  Body mass index is 33.67 kg/m².    Intake/Output Summary (Last 24 hours) at 11/22/2020 1729  Last data filed at 11/22/2020 0336  Gross per 24 hour   Intake 700 ml   Output 800 ml   Net -100 ml      Physical Exam  Vitals signs and nursing note reviewed.   Constitutional:       General: He is awake. He is not in acute distress.     Appearance: Normal appearance. He is well-developed. He is not diaphoretic.   HENT:      Head: Normocephalic and atraumatic.   Eyes:      Conjunctiva/sclera: Conjunctivae normal.   Neck:      Musculoskeletal: Normal range of motion and neck supple.   Cardiovascular:      Rate and Rhythm: Normal rate and regular rhythm.  No extrasystoles are present.     Heart sounds: S1 normal and S2 normal. No murmur.    Pulmonary:      Effort: Pulmonary effort is normal. No tachypnea, accessory muscle usage or respiratory distress.      Breath sounds: Normal air entry. Decreased breath sounds (mildly ) present. No wheezing, rhonchi or rales.   Abdominal:      General: Bowel sounds are normal. There is no distension.      Palpations: Abdomen is soft.      Tenderness: There is no abdominal tenderness.   Musculoskeletal: Normal range of motion.         General: No tenderness or deformity.      Right lower leg: No edema.      Left lower leg: No edema.   Skin:     General: Skin is warm and dry.      Capillary Refill: Capillary refill takes less than 2 seconds.      Findings: No erythema or rash.   Neurological:      General: No focal deficit present.      Mental Status: He is alert and oriented to person, place, and time.   Psychiatric:         Mood and Affect: Mood and affect normal.         Behavior: Behavior normal. Behavior is cooperative.         Significant Labs:   CBC:   Recent Labs   Lab 11/21/20  1455 11/22/20  0654   WBC 8.86 9.78   HGB 12.1* 11.5*   HCT 35.4* 34.5*    323     CMP:   Recent Labs   Lab 11/21/20  1455 11/22/20  0654    137   K 4.0 4.6    103   CO2 24 25   * 338*   BUN 20 21   CREATININE 1.0 1.1   CALCIUM 8.4* 9.0   PROT 7.0 7.0   ALBUMIN 2.4* 2.4*   BILITOT 0.5 0.6   ALKPHOS 104 117   AST 79* 55*   * 98*   ANIONGAP 12 9   EGFRNONAA >60.0 >60     Magnesium:   Recent Labs   Lab 11/22/20  0654   MG 1.9       Significant Imaging:     Imaging Results          X-Ray Chest AP Portable (Final result)  Result time 11/21/20 15:19:02    Final result by Hitesh Vu MD (11/21/20 15:19:02)                 Impression:      1.  The reticular and ground-glass opacities in the lung bases persist.  There is slightly lower lung volumes on the current study.  Negative for new pulmonary opacities.  Findings continue to indicate atypical viral pneumonia such as COVID-19 infection.    2.  Other  stable findings as noted above.      Electronically signed by: Hitesh Vu MD  Date:    11/21/2020  Time:    15:19             Narrative:    EXAMINATION:  XR CHEST AP PORTABLE    CLINICAL HISTORY:  Suspected Covid-19 Virus Infection;    COMPARISON:  December 19, 2020    FINDINGS:  EKG leads overlie the chest which is lordotic in position and rotated to the left.  Progressively lower lung volumes, with similar degree of basilar interstitial changes and ground-glass opacities.  The lungs are free of new pulmonary opacities.  The hilar and mediastinal contours and osseous structures are unchanged.

## 2020-11-22 NOTE — PLAN OF CARE
Pt AAO x4.  Pt remains free of falls throughout shift.  Pt denies pain throughout shift.  Plan of care reviewed. Pt verbalizes understanding.  Pt moving and turning independently. Frequent weight shifting encouraged.  Normal sinus rhythm sinus kali on monitor.  Hourly rounding completed.  Pt instructed to use IS q1hr while awake.   Will continue to monitor.

## 2020-11-22 NOTE — PROGRESS NOTES
[facility] Hospital Medicine  Progress Note    Patient Name: Vincenzo Kruse  MRN: [id number]  Patient Class: IP- Inpatient   Admission Date: 11/21/2020  Length of Stay: 0 days  Attending Physician: Nino Mackey MD  Primary Care Provider: Primary Doctor No        Subjective:     Principal Problem:Pneumonia due to COVID-19 virus        HPI:  Mr. Kruse is a 59 yo male with a PMHx of HTN, HLD, DM II, and obesity, who presented to the ED with c/o SOB that has progressively worsened over the past 1 week. Associated nonproductive cough, fever and chills. Symptoms are constant and moderate in severity. No mitigating or exacerbating factors reported. Patient was dx with COVID 3 days ago, and has been taking prednisone, and using a home pulse ox. Today, he was having trouble speaking, and had sats in the 70's at home. Patient denies any CP, palpitations, wheezing, congestion, orthopnea, edema, ABD pain, N/V/D, dysuria, hematuria, back pain, HA, lightheadedness, dizziness, syncope, or weakness. Upon arrival in the ED, patient was hypoxic with O2 sat 88% on RA. He was placed on 2L NC, which improved O2 sat to >95%. Initial work-up showed: Normal WBC, lactic 2.3, glucose 336, normal kidney function, AST 79, , negative troponin, , CPK 68, ferritin 4106, DDimer 0.69, , COVID-19 positive. ABG with pH 7.451, pCO2 33.9, pO2 60, SaO2 92 on RA. CXR showed reticular and ground-glass opacities in the lung bases persist, slightly lower lung volumes on the current study from previous, negative for new pulmonary opacities, findings continue to indicate atypical viral pneumonia such as COVID-19 infection. IV dexamethasone 6 mg given in the ED. Hospital Medicine was contacted for admission and patient placed in Observation for COVID-19 PNA.  Patient is a Full Code. His wife is SDM.      Overview/Hospital Course:  Mr Kruse is a 60 year old male who presented to [facility] with increase  shortness of breath over the pass week. Associated symptoms include cough, fever and chills. He has been diagnosis with COVID-19. Patient reports symptoms symptoms improved with oxygen. He has now been started on Remdesivir.     Interval History:     Review of Systems   Constitutional: Positive for chills and fever. Negative for diaphoresis and fatigue.   HENT: Negative for congestion, postnasal drip, rhinorrhea, sinus pressure and sore throat.    Respiratory: Positive for cough and shortness of breath. Negative for wheezing.    Cardiovascular: Negative for chest pain, palpitations and leg swelling.   Gastrointestinal: Negative for abdominal pain, diarrhea, nausea and vomiting.   Genitourinary: Negative for dysuria and hematuria.   Musculoskeletal: Negative for arthralgias, back pain and myalgias.   Skin: Negative for pallor and rash.   Neurological: Positive for weakness. Negative for dizziness, syncope, light-headedness, numbness and headaches.   All other systems reviewed and are negative.    Objective:     Vital Signs (Most Recent):  Temp: 97.4 °F (36.3 °C) (11/22/20 1617)  Pulse: 60 (11/22/20 1617)  Resp: (!) 22 (11/22/20 1617)  BP: (!) 160/82 (11/22/20 1617)  SpO2: (!) 93 % (11/22/20 1617) Vital Signs (24h Range):  Temp:  [97.2 °F (36.2 °C)-98.7 °F (37.1 °C)] 97.4 °F (36.3 °C)  Pulse:  [50-75] 60  Resp:  [18-33] 22  SpO2:  [92 %-95 %] 93 %  BP: (143-172)/(75-87) 160/82     Weight: 112.6 kg (248 lb 3.8 oz)  Body mass index is 33.67 kg/m².    Intake/Output Summary (Last 24 hours) at 11/22/2020 1729  Last data filed at 11/22/2020 0336  Gross per 24 hour   Intake 700 ml   Output 800 ml   Net -100 ml      Physical Exam  Vitals signs and nursing note reviewed.   Constitutional:       General: He is awake. He is not in acute distress.     Appearance: Normal appearance. He is well-developed. He is not diaphoretic.   HENT:      Head: Normocephalic and atraumatic.   Eyes:      Conjunctiva/sclera: Conjunctivae normal.    Neck:      Musculoskeletal: Normal range of motion and neck supple.   Cardiovascular:      Rate and Rhythm: Normal rate and regular rhythm.  No extrasystoles are present.     Heart sounds: S1 normal and S2 normal. No murmur.   Pulmonary:      Effort: Pulmonary effort is normal. No tachypnea, accessory muscle usage or respiratory distress.      Breath sounds: Normal air entry. Decreased breath sounds (mildly ) present. No wheezing, rhonchi or rales.   Abdominal:      General: Bowel sounds are normal. There is no distension.      Palpations: Abdomen is soft.      Tenderness: There is no abdominal tenderness.   Musculoskeletal: Normal range of motion.         General: No tenderness or deformity.      Right lower leg: No edema.      Left lower leg: No edema.   Skin:     General: Skin is warm and dry.      Capillary Refill: Capillary refill takes less than 2 seconds.      Findings: No erythema or rash.   Neurological:      General: No focal deficit present.      Mental Status: He is alert and oriented to person, place, and time.   Psychiatric:         Mood and Affect: Mood and affect normal.         Behavior: Behavior normal. Behavior is cooperative.         Significant Labs:   CBC:   Recent Labs   Lab 11/21/20  1455 11/22/20  0654   WBC 8.86 9.78   HGB 12.1* 11.5*   HCT 35.4* 34.5*    323     CMP:   Recent Labs   Lab 11/21/20  1455 11/22/20  0654    137   K 4.0 4.6    103   CO2 24 25   * 338*   BUN 20 21   CREATININE 1.0 1.1   CALCIUM 8.4* 9.0   PROT 7.0 7.0   ALBUMIN 2.4* 2.4*   BILITOT 0.5 0.6   ALKPHOS 104 117   AST 79* 55*   * 98*   ANIONGAP 12 9   EGFRNONAA >60.0 >60     Magnesium:   Recent Labs   Lab 11/22/20  0654   MG 1.9       Significant Imaging:     Imaging Results          X-Ray Chest AP Portable (Final result)  Result time 11/21/20 15:19:02    Final result by Hitesh Vu MD (11/21/20 15:19:02)                 Impression:      1.  The reticular and ground-glass  opacities in the lung bases persist.  There is slightly lower lung volumes on the current study.  Negative for new pulmonary opacities.  Findings continue to indicate atypical viral pneumonia such as COVID-19 infection.    2.  Other stable findings as noted above.      Electronically signed by: Hitesh Vu MD  Date:    11/21/2020  Time:    15:19             Narrative:    EXAMINATION:  XR CHEST AP PORTABLE    CLINICAL HISTORY:  Suspected Covid-19 Virus Infection;    COMPARISON:  December 19, 2020    FINDINGS:  EKG leads overlie the chest which is lordotic in position and rotated to the left.  Progressively lower lung volumes, with similar degree of basilar interstitial changes and ground-glass opacities.  The lungs are free of new pulmonary opacities.  The hilar and mediastinal contours and osseous structures are unchanged.                                Assessment/Plan:      * Acute respiratory failure with hypoxia secondary to pneumonia due to COVID-19 virus  - COVID-19 testing positive on 11/19/2020  - Infection Control notified   - O2 sat stable on 2L NC. Continue oxygen supplementation as needed to keep sat >92%.  - Albuterol MDI Q6.  - Dexamethasone 6 mg PO x 10 days.  - Will check procalcitonin and add empiric abx if elevated.  - MV, ascorbic acid, vitamin D, and zinc.  - Antitussives and antipyretics as needed  - Start Remdesivir     - Isolation:   - Airborne, Contact and Droplet Precautions  - Cohort patients into COVID units  - N95 mask, wear eye protection  - 20 second hand hygiene              - Limit visitors per hospital policy              - Consolidating lab draws, nursing care, provider visits, and interventions    - Diagnostics: (leukopenia, hyponatremia, hyperferritinemia, elevated troponin, elevated d-dimer, age, and comorbidities are significant predictors of poor clinical outcome)  CBC, CMP, Procalcitonin, Ferritin, CRP, LDH, Troponin, Blood Culture x2 and Portable CXR    -  Management:  Supplemental O2 to maintain SpO2 >92%  Telemetry  Continuous/intermittent Pulse Ox  Albuterol treatment PRN    Transaminitis  - AST 79, , TBili 0.5, alk phos 104.  - Avoid hepatotoxic agents.  - Hold home statin for now.  - Follow labs.    Hyperlipidemia  - Statin on hold due to elevated LFTs, resume once stable.    Essential hypertension  - Diet controlled at home.  - Clonidine PRN.  - Low sodium diet.    Type 2 diabetes mellitus with hyperglycemia, without long-term current use of insulin  - Hold metformin during hospital stay.  - AccuChecks with low dose SSI, will monitor Q6 hours while on steroids.  - Diabetic diet.  - Check HbA1c.      VTE Risk Mitigation (From admission, onward)         Ordered     enoxaparin injection 40 mg  Every 24 hours      11/21/20 1906     IP VTE HIGH RISK PATIENT  Once      11/21/20 1906     Place sequential compression device  Until discontinued      11/21/20 1906                Discharge Planning   LEA:      Code Status: Full Code   Is the patient medically ready for discharge?:     Reason for patient still in hospital (select all that apply): Patient trending condition and Treatment                     Jerod Martinez NP  Department of Hospital Medicine   [facility]

## 2020-11-22 NOTE — ASSESSMENT & PLAN NOTE
- COVID-19 testing positive on 11/19/2020  - Infection Control notified   - O2 sat stable on 2L NC. Continue oxygen supplementation as needed to keep sat >92%.  - Albuterol MDI Q6.  - Dexamethasone 6 mg PO x 10 days.  - Will check procalcitonin and add empiric abx if elevated.  - MV, ascorbic acid, vitamin D, and zinc.  - Antitussives and antipyretics as needed    - Isolation:   - Airborne, Contact and Droplet Precautions  - Cohort patients into COVID units  - N95 mask, wear eye protection  - 20 second hand hygiene              - Limit visitors per hospital policy              - Consolidating lab draws, nursing care, provider visits, and interventions    - Diagnostics: (leukopenia, hyponatremia, hyperferritinemia, elevated troponin, elevated d-dimer, age, and comorbidities are significant predictors of poor clinical outcome)  CBC, CMP, Procalcitonin, Ferritin, CRP, LDH, Troponin, Blood Culture x2 and Portable CXR    - Management:  Supplemental O2 to maintain SpO2 >92%  Telemetry  Continuous/intermittent Pulse Ox  Albuterol treatment PRN

## 2020-11-22 NOTE — ED NOTES
Pt alert and oriented, calm/cooperative, and in NAD. Pt resting in bed. NAD, VSS, RR equal and unlabored. Will continue to monitor

## 2020-11-22 NOTE — NURSING
"HR dropping in the 40's while pt napping.   Pt asymptomatic.  States, "I was sleeping hard."  Notified Shaka Martinez NP. He said it's ok as long as pt is asymptomatic.  "

## 2020-11-22 NOTE — ASSESSMENT & PLAN NOTE
- Hold metformin during hospital stay.  - AccuChecks with low dose SSI, will monitor Q6 hours while on steroids.  - Diabetic diet.  - Check HbA1c.

## 2020-11-23 PROBLEM — R00.1 BRADYCARDIA: Status: ACTIVE | Noted: 2020-11-23

## 2020-11-23 LAB
ALBUMIN SERPL BCP-MCNC: 2.4 G/DL (ref 3.5–5.2)
ALP SERPL-CCNC: 96 U/L (ref 55–135)
ALT SERPL W/O P-5'-P-CCNC: 85 U/L (ref 10–44)
ANION GAP SERPL CALC-SCNC: 9 MMOL/L (ref 8–16)
AST SERPL-CCNC: 41 U/L (ref 10–40)
BASOPHILS # BLD AUTO: 0.01 K/UL (ref 0–0.2)
BASOPHILS NFR BLD: 0.1 % (ref 0–1.9)
BILIRUB SERPL-MCNC: 0.4 MG/DL (ref 0.1–1)
BUN SERPL-MCNC: 23 MG/DL (ref 8–23)
CALCIUM SERPL-MCNC: 9.2 MG/DL (ref 8.7–10.5)
CHLORIDE SERPL-SCNC: 102 MMOL/L (ref 95–110)
CO2 SERPL-SCNC: 26 MMOL/L (ref 23–29)
CREAT SERPL-MCNC: 0.9 MG/DL (ref 0.5–1.4)
CRP SERPL-MCNC: 56.9 MG/L (ref 0–8.2)
D DIMER PPP IA.FEU-MCNC: 0.65 MG/L FEU
DIFFERENTIAL METHOD: ABNORMAL
EOSINOPHIL # BLD AUTO: 0 K/UL (ref 0–0.5)
EOSINOPHIL NFR BLD: 0 % (ref 0–8)
ERYTHROCYTE [DISTWIDTH] IN BLOOD BY AUTOMATED COUNT: 11.8 % (ref 11.5–14.5)
EST. GFR  (AFRICAN AMERICAN): >60 ML/MIN/1.73 M^2
EST. GFR  (NON AFRICAN AMERICAN): >60 ML/MIN/1.73 M^2
GLUCOSE SERPL-MCNC: 328 MG/DL (ref 70–110)
HCT VFR BLD AUTO: 35.4 % (ref 40–54)
HGB BLD-MCNC: 11.8 G/DL (ref 14–18)
IMM GRANULOCYTES # BLD AUTO: 0.06 K/UL (ref 0–0.04)
IMM GRANULOCYTES NFR BLD AUTO: 0.8 % (ref 0–0.5)
LYMPHOCYTES # BLD AUTO: 1 K/UL (ref 1–4.8)
LYMPHOCYTES NFR BLD: 13.8 % (ref 18–48)
MAGNESIUM SERPL-MCNC: 1.6 MG/DL (ref 1.6–2.6)
MCH RBC QN AUTO: 31.7 PG (ref 27–31)
MCHC RBC AUTO-ENTMCNC: 33.3 G/DL (ref 32–36)
MCV RBC AUTO: 95 FL (ref 82–98)
MONOCYTES # BLD AUTO: 0.8 K/UL (ref 0.3–1)
MONOCYTES NFR BLD: 10.8 % (ref 4–15)
NEUTROPHILS # BLD AUTO: 5.3 K/UL (ref 1.8–7.7)
NEUTROPHILS NFR BLD: 74.5 % (ref 38–73)
NRBC BLD-RTO: 0 /100 WBC
PHOSPHATE SERPL-MCNC: 3.7 MG/DL (ref 2.7–4.5)
PLATELET # BLD AUTO: 357 K/UL (ref 150–350)
PMV BLD AUTO: 9.7 FL (ref 9.2–12.9)
POCT GLUCOSE: 322 MG/DL (ref 70–110)
POCT GLUCOSE: 344 MG/DL (ref 70–110)
POCT GLUCOSE: 356 MG/DL (ref 70–110)
POCT GLUCOSE: 364 MG/DL (ref 70–110)
POCT GLUCOSE: 430 MG/DL (ref 70–110)
POTASSIUM SERPL-SCNC: 4.5 MMOL/L (ref 3.5–5.1)
PROT SERPL-MCNC: 7.1 G/DL (ref 6–8.4)
RBC # BLD AUTO: 3.72 M/UL (ref 4.6–6.2)
SODIUM SERPL-SCNC: 137 MMOL/L (ref 136–145)
TROPONIN I SERPL DL<=0.01 NG/ML-MCNC: <0.006 NG/ML (ref 0–0.03)
WBC # BLD AUTO: 7.1 K/UL (ref 3.9–12.7)

## 2020-11-23 PROCEDURE — 63600175 PHARM REV CODE 636 W HCPCS: Performed by: NURSE PRACTITIONER

## 2020-11-23 PROCEDURE — 80053 COMPREHEN METABOLIC PANEL: CPT

## 2020-11-23 PROCEDURE — C9399 UNCLASSIFIED DRUGS OR BIOLOG: HCPCS | Performed by: INTERNAL MEDICINE

## 2020-11-23 PROCEDURE — 94761 N-INVAS EAR/PLS OXIMETRY MLT: CPT

## 2020-11-23 PROCEDURE — 85025 COMPLETE CBC W/AUTO DIFF WBC: CPT

## 2020-11-23 PROCEDURE — 99900035 HC TECH TIME PER 15 MIN (STAT)

## 2020-11-23 PROCEDURE — 94799 UNLISTED PULMONARY SVC/PX: CPT

## 2020-11-23 PROCEDURE — 93010 ELECTROCARDIOGRAM REPORT: CPT | Mod: ,,, | Performed by: INTERNAL MEDICINE

## 2020-11-23 PROCEDURE — 86140 C-REACTIVE PROTEIN: CPT

## 2020-11-23 PROCEDURE — 27000221 HC OXYGEN, UP TO 24 HOURS

## 2020-11-23 PROCEDURE — 21400001 HC TELEMETRY ROOM

## 2020-11-23 PROCEDURE — 25000003 PHARM REV CODE 250: Performed by: INTERNAL MEDICINE

## 2020-11-23 PROCEDURE — 83735 ASSAY OF MAGNESIUM: CPT

## 2020-11-23 PROCEDURE — 84100 ASSAY OF PHOSPHORUS: CPT

## 2020-11-23 PROCEDURE — 94640 AIRWAY INHALATION TREATMENT: CPT

## 2020-11-23 PROCEDURE — 93005 ELECTROCARDIOGRAM TRACING: CPT

## 2020-11-23 PROCEDURE — C9399 UNCLASSIFIED DRUGS OR BIOLOG: HCPCS | Performed by: NURSE PRACTITIONER

## 2020-11-23 PROCEDURE — 85379 FIBRIN DEGRADATION QUANT: CPT

## 2020-11-23 PROCEDURE — 96372 THER/PROPH/DIAG INJ SC/IM: CPT

## 2020-11-23 PROCEDURE — 25000003 PHARM REV CODE 250: Performed by: NURSE PRACTITIONER

## 2020-11-23 PROCEDURE — 84484 ASSAY OF TROPONIN QUANT: CPT

## 2020-11-23 PROCEDURE — 36415 COLL VENOUS BLD VENIPUNCTURE: CPT

## 2020-11-23 PROCEDURE — 63600175 PHARM REV CODE 636 W HCPCS: Performed by: INTERNAL MEDICINE

## 2020-11-23 PROCEDURE — 93010 EKG 12-LEAD: ICD-10-PCS | Mod: ,,, | Performed by: INTERNAL MEDICINE

## 2020-11-23 RX ORDER — INSULIN ASPART 100 [IU]/ML
1-10 INJECTION, SOLUTION INTRAVENOUS; SUBCUTANEOUS
Status: DISCONTINUED | OUTPATIENT
Start: 2020-11-23 | End: 2020-11-24 | Stop reason: HOSPADM

## 2020-11-23 RX ORDER — GLUCAGON 1 MG
1 KIT INJECTION
Status: DISCONTINUED | OUTPATIENT
Start: 2020-11-23 | End: 2020-11-24 | Stop reason: HOSPADM

## 2020-11-23 RX ORDER — IBUPROFEN 200 MG
24 TABLET ORAL
Status: DISCONTINUED | OUTPATIENT
Start: 2020-11-23 | End: 2020-11-24 | Stop reason: HOSPADM

## 2020-11-23 RX ORDER — IBUPROFEN 200 MG
16 TABLET ORAL
Status: DISCONTINUED | OUTPATIENT
Start: 2020-11-23 | End: 2020-11-24 | Stop reason: HOSPADM

## 2020-11-23 RX ORDER — CLONIDINE HYDROCHLORIDE 0.2 MG/1
0.2 TABLET ORAL EVERY 6 HOURS PRN
Status: DISCONTINUED | OUTPATIENT
Start: 2020-11-23 | End: 2020-11-23

## 2020-11-23 RX ORDER — HYDRALAZINE HYDROCHLORIDE 20 MG/ML
10 INJECTION INTRAMUSCULAR; INTRAVENOUS EVERY 6 HOURS PRN
Status: DISCONTINUED | OUTPATIENT
Start: 2020-11-23 | End: 2020-11-24 | Stop reason: HOSPADM

## 2020-11-23 RX ADMIN — DEXAMETHASONE 6 MG: 4 TABLET ORAL at 08:11

## 2020-11-23 RX ADMIN — INSULIN ASPART 10 UNITS: 100 INJECTION, SOLUTION INTRAVENOUS; SUBCUTANEOUS at 04:11

## 2020-11-23 RX ADMIN — CELECOXIB 200 MG: 100 CAPSULE ORAL at 08:11

## 2020-11-23 RX ADMIN — INSULIN ASPART 4 UNITS: 100 INJECTION, SOLUTION INTRAVENOUS; SUBCUTANEOUS at 10:11

## 2020-11-23 RX ADMIN — OXYCODONE HYDROCHLORIDE AND ACETAMINOPHEN 500 MG: 500 TABLET ORAL at 08:11

## 2020-11-23 RX ADMIN — INSULIN ASPART 10 UNITS: 100 INJECTION, SOLUTION INTRAVENOUS; SUBCUTANEOUS at 12:11

## 2020-11-23 RX ADMIN — OXYCODONE HYDROCHLORIDE AND ACETAMINOPHEN 500 MG: 500 TABLET ORAL at 10:11

## 2020-11-23 RX ADMIN — INSULIN DETEMIR 15 UNITS: 100 INJECTION, SOLUTION SUBCUTANEOUS at 10:11

## 2020-11-23 RX ADMIN — ALLOPURINOL 300 MG: 300 TABLET ORAL at 08:11

## 2020-11-23 RX ADMIN — CLONIDINE HYDROCHLORIDE 0.2 MG: 0.2 TABLET ORAL at 05:11

## 2020-11-23 RX ADMIN — INSULIN ASPART 4 UNITS: 100 INJECTION, SOLUTION INTRAVENOUS; SUBCUTANEOUS at 05:11

## 2020-11-23 RX ADMIN — ALBUTEROL SULFATE 2 PUFF: 90 AEROSOL, METERED RESPIRATORY (INHALATION) at 08:11

## 2020-11-23 RX ADMIN — ALBUTEROL SULFATE 2 PUFF: 90 AEROSOL, METERED RESPIRATORY (INHALATION) at 01:11

## 2020-11-23 RX ADMIN — CLONIDINE HYDROCHLORIDE 0.2 MG: 0.2 TABLET ORAL at 04:11

## 2020-11-23 RX ADMIN — INSULIN ASPART 2 UNITS: 100 INJECTION, SOLUTION INTRAVENOUS; SUBCUTANEOUS at 12:11

## 2020-11-23 RX ADMIN — CYANOCOBALAMIN TAB 250 MCG 1000 MCG: 250 TAB at 08:11

## 2020-11-23 RX ADMIN — REMDESIVIR 100 MG: 100 INJECTION, POWDER, LYOPHILIZED, FOR SOLUTION INTRAVENOUS at 04:11

## 2020-11-23 RX ADMIN — Medication 1000 UNITS: at 08:11

## 2020-11-23 RX ADMIN — ALBUTEROL SULFATE 2 PUFF: 90 AEROSOL, METERED RESPIRATORY (INHALATION) at 12:11

## 2020-11-23 RX ADMIN — THERA TABS 1 TABLET: TAB at 08:11

## 2020-11-23 RX ADMIN — ENOXAPARIN SODIUM 40 MG: 100 INJECTION SUBCUTANEOUS at 05:11

## 2020-11-23 RX ADMIN — ZINC SULFATE 220 MG (50 MG) CAPSULE 220 MG: CAPSULE at 08:11

## 2020-11-23 RX ADMIN — INSULIN DETEMIR 10 UNITS: 100 INJECTION, SOLUTION SUBCUTANEOUS at 11:11

## 2020-11-23 RX ADMIN — ALBUTEROL SULFATE 2 PUFF: 90 AEROSOL, METERED RESPIRATORY (INHALATION) at 09:11

## 2020-11-23 NOTE — PLAN OF CARE
No significant changes this shift. Remains on 2.5L NC. Patient verbalizes understanding of care. NADN. Will continue to monitor.

## 2020-11-23 NOTE — ASSESSMENT & PLAN NOTE
- COVID-19 testing positive on 11/19/2020  - Infection Control notified   - O2 sat stable on 2L NC. Continue oxygen supplementation as needed to keep sat >92%.  - Albuterol MDI Q6.  - Dexamethasone 6 mg PO x 10 days.  - Will check procalcitonin and add empiric abx if elevated.  - MV, ascorbic acid, vitamin D, and zinc.  - Antitussives and antipyretics as needed  - Continue Remdesivir     - Isolation:   - Airborne, Contact and Droplet Precautions  - Cohort patients into COVID units  - N95 mask, wear eye protection  - 20 second hand hygiene              - Limit visitors per hospital policy              - Consolidating lab draws, nursing care, provider visits, and interventions    - Diagnostics: (leukopenia, hyponatremia, hyperferritinemia, elevated troponin, elevated d-dimer, age, and comorbidities are significant predictors of poor clinical outcome)  CBC, CMP, Procalcitonin, Ferritin, CRP, LDH, Troponin, Blood Culture x2 and Portable CXR    - Management:  Supplemental O2 to maintain SpO2 >92%  Telemetry  Continuous/intermittent Pulse Ox  Albuterol treatment PRN

## 2020-11-23 NOTE — PLAN OF CARE
SW spoke with pt's spouse via phone to complete discharge assessment. Pt lives at home with her spouse. Pt help at home is her spouse. Pt is ambulatory and ADLs. Pt son will transport him when he is discharged. Pt may benefit from Home O2. No other needs identified at this time. HAM provided a transitional care folder, information on advanced directives, information on pharmacy bedside delivery, and discharge planning begins on admission with contact information for any needs/questions. Pt board updated with SW name and number.    Payor: BLUE CROSS BLUE SHIELD / Plan: BCBS ALL OUT OF STATE / Product Type: PPO /   PCP: Primary Doctor No  Pharmacy: No Pharmacies Listed  Bedside Delivery: No  MyChart: link sent       11/23/20 5783   Discharge Assessment   Assessment Type Discharge Planning Assessment   Confirmed/corrected address and phone number on facesheet? Yes   Assessment information obtained from? Other;Caregiver  (Justine Kruse (spouse) [phone])   Expected Length of Stay (days)   (TBD)   Communicated expected length of stay with patient/caregiver yes   Prior to hospitilization cognitive status: Alert/Oriented   Prior to hospitalization functional status: Independent   Current cognitive status: Alert/Oriented   Current Functional Status: Independent   Facility Arrived From: home   Lives With spouse   Able to Return to Prior Arrangements yes   Is patient able to care for self after discharge? Yes   Who are your caregiver(s) and their phone number(s)? Justine Kruse (spouse) [phone]   Patient's perception of discharge disposition home or selfcare   Readmission Within the Last 30 Days no previous admission in last 30 days   Patient currently being followed by outpatient case management? No   Patient currently receives any other outside agency services? No   Equipment Currently Used at Home none   Part D Coverage n/a   Do you have any problems affording any of your prescribed medications? No   Is the patient  taking medications as prescribed? yes   Does the patient have transportation home? Yes   Transportation Anticipated family or friend will provide   Dialysis Name and Scheduled days n/a   Does the patient receive services at the Coumadin Clinic? No   Discharge Plan A Home with family   Discharge Plan B Home with family   DME Needed Upon Discharge  oxygen   Patient/Family in Agreement with Plan yes   Does the patient have transportation to healthcare appointments? Yes       Beni Belle LMSW 11/23/2020 12:06 PM

## 2020-11-23 NOTE — PLAN OF CARE
Progressing towards goals.    Problem: Fall Injury Risk  Goal: Absence of Fall and Fall-Related Injury  Outcome: Ongoing, Progressing     Problem: Adult Inpatient Plan of Care  Goal: Plan of Care Review  Outcome: Ongoing, Progressing  Goal: Patient-Specific Goal (Individualization)  Outcome: Ongoing, Progressing  Goal: Absence of Hospital-Acquired Illness or Injury  Outcome: Ongoing, Progressing  Goal: Optimal Comfort and Wellbeing  Outcome: Ongoing, Progressing  Goal: Readiness for Transition of Care  Outcome: Ongoing, Progressing  Goal: Rounds/Family Conference  Outcome: Ongoing, Progressing

## 2020-11-23 NOTE — PROGRESS NOTES
[facility] Hospital Medicine  Progress Note    Patient Name: Vincenzo Kruse  MRN: [id number]  Patient Class: IP- Inpatient   Admission Date: 11/21/2020  Length of Stay: 1 days  Attending Physician: Nino Mackey MD  Primary Care Provider: Primary Doctor No        Subjective:     Principal Problem:Pneumonia due to COVID-19 virus        HPI:  Mr. Kruse is a 61 yo male with a PMHx of HTN, HLD, DM II, and obesity, who presented to the ED with c/o SOB that has progressively worsened over the past 1 week. Associated nonproductive cough, fever and chills. Symptoms are constant and moderate in severity. No mitigating or exacerbating factors reported. Patient was dx with COVID 3 days ago, and has been taking prednisone, and using a home pulse ox. Today, he was having trouble speaking, and had sats in the 70's at home. Patient denies any CP, palpitations, wheezing, congestion, orthopnea, edema, ABD pain, N/V/D, dysuria, hematuria, back pain, HA, lightheadedness, dizziness, syncope, or weakness. Upon arrival in the ED, patient was hypoxic with O2 sat 88% on RA. He was placed on 2L NC, which improved O2 sat to >95%. Initial work-up showed: Normal WBC, lactic 2.3, glucose 336, normal kidney function, AST 79, , negative troponin, , CPK 68, ferritin 4106, DDimer 0.69, , COVID-19 positive. ABG with pH 7.451, pCO2 33.9, pO2 60, SaO2 92 on RA. CXR showed reticular and ground-glass opacities in the lung bases persist, slightly lower lung volumes on the current study from previous, negative for new pulmonary opacities, findings continue to indicate atypical viral pneumonia such as COVID-19 infection. IV dexamethasone 6 mg given in the ED. Hospital Medicine was contacted for admission and patient placed in Observation for COVID-19 PNA.  Patient is a Full Code. His wife is SDM.      Overview/Hospital Course:  Mr Kruse is a 60 year old male who presented to [facility] Emergency Room with increase  shortness of breath over the pass week. Associated symptoms include cough, fever and chills. He has been diagnosis with COVID-19. Patient reports symptoms symptoms improved with oxygen. He has now been started on Remdesivir. As of     Interval History: Pt report symptoms slowly improving, continues to require oxygen to maintain O 2 sats. Continue Remdesivir.    Review of Systems   Constitutional: Positive for activity change. Negative for appetite change, chills, diaphoresis, fatigue and fever.   HENT: Negative for congestion, ear discharge, rhinorrhea, sinus pressure, sore throat and trouble swallowing.    Eyes: Negative for discharge and visual disturbance.   Respiratory: Positive for shortness of breath. Negative for apnea, choking, chest tightness, wheezing and stridor.         MCCANN    Cardiovascular: Negative for chest pain, palpitations and leg swelling.   Gastrointestinal: Negative for abdominal distention, abdominal pain, diarrhea, nausea and vomiting.   Endocrine: Negative for cold intolerance and heat intolerance.   Genitourinary: Negative for dysuria, frequency and hematuria.   Musculoskeletal: Negative for arthralgias, back pain, myalgias and neck pain.   Skin: Negative for pallor and rash.   Neurological: Positive for weakness. Negative for dizziness, seizures, syncope and headaches.   Psychiatric/Behavioral: Negative for agitation, confusion and sleep disturbance.     Objective:     Vital Signs (Most Recent):  Temp: 98.1 °F (36.7 °C) (11/23/20 1248)  Pulse: (!) 57 (11/23/20 1319)  Resp: 16 (11/23/20 1319)  BP: 133/62 (11/23/20 1248)  SpO2: 95 % (11/23/20 1319) Vital Signs (24h Range):  Temp:  [97.4 °F (36.3 °C)-98.5 °F (36.9 °C)] 98.1 °F (36.7 °C)  Pulse:  [45-64] 57  Resp:  [16-22] 16  SpO2:  [92 %-97 %] 95 %  BP: (133-190)/(62-93) 133/62     Weight: 112 kg (246 lb 14.6 oz)  Body mass index is 33.49 kg/m².    Intake/Output Summary (Last 24 hours) at 11/23/2020 1356  Last data filed at 11/23/2020  0500  Gross per 24 hour   Intake 990 ml   Output 400 ml   Net 590 ml      Physical Exam  Vitals signs and nursing note reviewed.   Constitutional:       General: He is awake. He is not in acute distress.     Appearance: Normal appearance. He is well-developed. He is not diaphoretic.   HENT:      Head: Normocephalic and atraumatic.   Eyes:      Conjunctiva/sclera: Conjunctivae normal.   Neck:      Musculoskeletal: Normal range of motion and neck supple.   Cardiovascular:      Rate and Rhythm: Regular rhythm. Bradycardia present.  No extrasystoles are present.     Heart sounds: S1 normal and S2 normal. No murmur.   Pulmonary:      Effort: Pulmonary effort is normal. No tachypnea, accessory muscle usage or respiratory distress.      Breath sounds: Normal air entry. Decreased breath sounds (mildly ) present. No wheezing, rhonchi or rales.   Abdominal:      General: Bowel sounds are normal. There is no distension.      Palpations: Abdomen is soft.      Tenderness: There is no abdominal tenderness.   Musculoskeletal: Normal range of motion.         General: No tenderness or deformity.      Right lower leg: No edema.      Left lower leg: No edema.   Skin:     General: Skin is warm and dry.      Capillary Refill: Capillary refill takes less than 2 seconds.      Findings: No erythema or rash.   Neurological:      General: No focal deficit present.      Mental Status: He is alert and oriented to person, place, and time.   Psychiatric:         Mood and Affect: Mood and affect normal.         Behavior: Behavior normal. Behavior is cooperative.         Significant Labs:   CBC:   Recent Labs   Lab 11/21/20  1455 11/22/20  0654 11/23/20  0636   WBC 8.86 9.78 7.10   HGB 12.1* 11.5* 11.8*   HCT 35.4* 34.5* 35.4*    323 357*     CMP:   Recent Labs   Lab 11/21/20  1455 11/22/20  0654 11/23/20  0636    137 137   K 4.0 4.6 4.5    103 102   CO2 24 25 26   * 338* 328*   BUN 20 21 23   CREATININE 1.0 1.1 0.9    CALCIUM 8.4* 9.0 9.2   PROT 7.0 7.0 7.1   ALBUMIN 2.4* 2.4* 2.4*   BILITOT 0.5 0.6 0.4   ALKPHOS 104 117 96   AST 79* 55* 41*   * 98* 85*   ANIONGAP 12 9 9   EGFRNONAA >60.0 >60 >60       Significant Imaging:     Imaging Results          X-Ray Chest AP Portable (Final result)  Result time 11/21/20 15:19:02    Final result by Hitesh Vu MD (11/21/20 15:19:02)                 Impression:      1.  The reticular and ground-glass opacities in the lung bases persist.  There is slightly lower lung volumes on the current study.  Negative for new pulmonary opacities.  Findings continue to indicate atypical viral pneumonia such as COVID-19 infection.    2.  Other stable findings as noted above.      Electronically signed by: Hitesh Vu MD  Date:    11/21/2020  Time:    15:19             Narrative:    EXAMINATION:  XR CHEST AP PORTABLE    CLINICAL HISTORY:  Suspected Covid-19 Virus Infection;    COMPARISON:  December 19, 2020    FINDINGS:  EKG leads overlie the chest which is lordotic in position and rotated to the left.  Progressively lower lung volumes, with similar degree of basilar interstitial changes and ground-glass opacities.  The lungs are free of new pulmonary opacities.  The hilar and mediastinal contours and osseous structures are unchanged.                                Assessment/Plan:      * Acute respiratory failure with hypoxia secondary to pneumonia due to COVID-19 virus  - COVID-19 testing positive on 11/19/2020  - Infection Control notified   - O2 sat stable on 2L NC. Continue oxygen supplementation as needed to keep sat >92%.  - Albuterol MDI Q6.  - Dexamethasone 6 mg PO x 10 days.  - Will check procalcitonin and add empiric abx if elevated.  - MV, ascorbic acid, vitamin D, and zinc.  - Antitussives and antipyretics as needed  - Continue Remdesivir     - Isolation:   - Airborne, Contact and Droplet Precautions  - Cohort patients into COVID units  - N95 mask, wear eye protection  - 20  second hand hygiene              - Limit visitors per hospital policy              - Consolidating lab draws, nursing care, provider visits, and interventions    - Diagnostics: (leukopenia, hyponatremia, hyperferritinemia, elevated troponin, elevated d-dimer, age, and comorbidities are significant predictors of poor clinical outcome)  CBC, CMP, Procalcitonin, Ferritin, CRP, LDH, Troponin, Blood Culture x2 and Portable CXR    - Management:  Supplemental O2 to maintain SpO2 >92%  Telemetry  Continuous/intermittent Pulse Ox  Albuterol treatment PRN    Bradycardia  Heart rate running in the 40-50's   Asymptomatic  Will repeat EKG and troponin        Transaminitis  - AST 79, , TBili 0.5, alk phos 104.  - Avoid hepatotoxic agents.  - Hold home statin for now.  - Follow labs.    Hyperlipidemia  - Statin on hold due to elevated LFTs, resume once stable.    11/23   LFT's trending downward    Essential hypertension  - Diet controlled at home.  - Clonidine PRN.  - Low sodium diet.    Type 2 diabetes mellitus with hyperglycemia, without long-term current use of insulin  - Hold metformin during hospital stay.  - AccuChecks with low dose SSI, will monitor Q6 hours while on steroids.  - Diabetic diet.  - Check HbA1c.    11/23  Glucose elevated, due to steroid  Continue ACCU check with moderate SSI   Start detemir 10 unit SQ BID       VTE Risk Mitigation (From admission, onward)         Ordered     enoxaparin injection 40 mg  Every 24 hours      11/21/20 1906     IP VTE HIGH RISK PATIENT  Once      11/21/20 1906     Place sequential compression device  Until discontinued      11/21/20 1906                Discharge Planning   LEA:      Code Status: Full Code   Is the patient medically ready for discharge?:     Reason for patient still in hospital (select all that apply): Patient trending condition and Treatment  Discharge Plan A: Home with family                  Jerod Martinez NP  Department of Hospital Medicine   [facility]

## 2020-11-23 NOTE — SUBJECTIVE & OBJECTIVE
Interval History: Pt report symptoms slowly improving, continues to require oxygen to maintain O 2 sats. Continue Remdesivir.    Review of Systems   Constitutional: Positive for activity change. Negative for appetite change, chills, diaphoresis, fatigue and fever.   HENT: Negative for congestion, ear discharge, rhinorrhea, sinus pressure, sore throat and trouble swallowing.    Eyes: Negative for discharge and visual disturbance.   Respiratory: Positive for shortness of breath. Negative for apnea, choking, chest tightness, wheezing and stridor.         MCCANN    Cardiovascular: Negative for chest pain, palpitations and leg swelling.   Gastrointestinal: Negative for abdominal distention, abdominal pain, diarrhea, nausea and vomiting.   Endocrine: Negative for cold intolerance and heat intolerance.   Genitourinary: Negative for dysuria, frequency and hematuria.   Musculoskeletal: Negative for arthralgias, back pain, myalgias and neck pain.   Skin: Negative for pallor and rash.   Neurological: Positive for weakness. Negative for dizziness, seizures, syncope and headaches.   Psychiatric/Behavioral: Negative for agitation, confusion and sleep disturbance.     Objective:     Vital Signs (Most Recent):  Temp: 98.1 °F (36.7 °C) (11/23/20 1248)  Pulse: (!) 57 (11/23/20 1319)  Resp: 16 (11/23/20 1319)  BP: 133/62 (11/23/20 1248)  SpO2: 95 % (11/23/20 1319) Vital Signs (24h Range):  Temp:  [97.4 °F (36.3 °C)-98.5 °F (36.9 °C)] 98.1 °F (36.7 °C)  Pulse:  [45-64] 57  Resp:  [16-22] 16  SpO2:  [92 %-97 %] 95 %  BP: (133-190)/(62-93) 133/62     Weight: 112 kg (246 lb 14.6 oz)  Body mass index is 33.49 kg/m².    Intake/Output Summary (Last 24 hours) at 11/23/2020 1356  Last data filed at 11/23/2020 0500  Gross per 24 hour   Intake 990 ml   Output 400 ml   Net 590 ml      Physical Exam  Vitals signs and nursing note reviewed.   Constitutional:       General: He is awake. He is not in acute distress.     Appearance: Normal appearance. He  is well-developed. He is not diaphoretic.   HENT:      Head: Normocephalic and atraumatic.   Eyes:      Conjunctiva/sclera: Conjunctivae normal.   Neck:      Musculoskeletal: Normal range of motion and neck supple.   Cardiovascular:      Rate and Rhythm: Regular rhythm. Bradycardia present.  No extrasystoles are present.     Heart sounds: S1 normal and S2 normal. No murmur.   Pulmonary:      Effort: Pulmonary effort is normal. No tachypnea, accessory muscle usage or respiratory distress.      Breath sounds: Normal air entry. Decreased breath sounds (mildly ) present. No wheezing, rhonchi or rales.   Abdominal:      General: Bowel sounds are normal. There is no distension.      Palpations: Abdomen is soft.      Tenderness: There is no abdominal tenderness.   Musculoskeletal: Normal range of motion.         General: No tenderness or deformity.      Right lower leg: No edema.      Left lower leg: No edema.   Skin:     General: Skin is warm and dry.      Capillary Refill: Capillary refill takes less than 2 seconds.      Findings: No erythema or rash.   Neurological:      General: No focal deficit present.      Mental Status: He is alert and oriented to person, place, and time.   Psychiatric:         Mood and Affect: Mood and affect normal.         Behavior: Behavior normal. Behavior is cooperative.         Significant Labs:   CBC:   Recent Labs   Lab 11/21/20  1455 11/22/20  0654 11/23/20  0636   WBC 8.86 9.78 7.10   HGB 12.1* 11.5* 11.8*   HCT 35.4* 34.5* 35.4*    323 357*     CMP:   Recent Labs   Lab 11/21/20  1455 11/22/20  0654 11/23/20  0636    137 137   K 4.0 4.6 4.5    103 102   CO2 24 25 26   * 338* 328*   BUN 20 21 23   CREATININE 1.0 1.1 0.9   CALCIUM 8.4* 9.0 9.2   PROT 7.0 7.0 7.1   ALBUMIN 2.4* 2.4* 2.4*   BILITOT 0.5 0.6 0.4   ALKPHOS 104 117 96   AST 79* 55* 41*   * 98* 85*   ANIONGAP 12 9 9   EGFRNONAA >60.0 >60 >60       Significant Imaging:     Imaging Results           X-Ray Chest AP Portable (Final result)  Result time 11/21/20 15:19:02    Final result by Hitesh Vu MD (11/21/20 15:19:02)                 Impression:      1.  The reticular and ground-glass opacities in the lung bases persist.  There is slightly lower lung volumes on the current study.  Negative for new pulmonary opacities.  Findings continue to indicate atypical viral pneumonia such as COVID-19 infection.    2.  Other stable findings as noted above.      Electronically signed by: Hitesh Vu MD  Date:    11/21/2020  Time:    15:19             Narrative:    EXAMINATION:  XR CHEST AP PORTABLE    CLINICAL HISTORY:  Suspected Covid-19 Virus Infection;    COMPARISON:  December 19, 2020    FINDINGS:  EKG leads overlie the chest which is lordotic in position and rotated to the left.  Progressively lower lung volumes, with similar degree of basilar interstitial changes and ground-glass opacities.  The lungs are free of new pulmonary opacities.  The hilar and mediastinal contours and osseous structures are unchanged.

## 2020-11-23 NOTE — ASSESSMENT & PLAN NOTE
- Hold metformin during hospital stay.  - AccuChecks with low dose SSI, will monitor Q6 hours while on steroids.  - Diabetic diet.  - Check HbA1c.    11/23  Glucose elevated, due to steroid  Continue ACCU check with moderate SSI   Start detemir 10 unit SQ BID

## 2020-11-24 VITALS
HEIGHT: 72 IN | TEMPERATURE: 99 F | HEART RATE: 56 BPM | RESPIRATION RATE: 18 BRPM | DIASTOLIC BLOOD PRESSURE: 81 MMHG | OXYGEN SATURATION: 95 % | SYSTOLIC BLOOD PRESSURE: 157 MMHG | WEIGHT: 246.94 LBS | BODY MASS INDEX: 33.45 KG/M2

## 2020-11-24 PROBLEM — R74.01 TRANSAMINITIS: Status: RESOLVED | Noted: 2020-11-22 | Resolved: 2020-11-24

## 2020-11-24 LAB
ALBUMIN SERPL BCP-MCNC: 2.6 G/DL (ref 3.5–5.2)
ALP SERPL-CCNC: 93 U/L (ref 55–135)
ALT SERPL W/O P-5'-P-CCNC: 70 U/L (ref 10–44)
ANION GAP SERPL CALC-SCNC: 11 MMOL/L (ref 8–16)
AST SERPL-CCNC: 32 U/L (ref 10–40)
BASOPHILS # BLD AUTO: 0.02 K/UL (ref 0–0.2)
BASOPHILS NFR BLD: 0.3 % (ref 0–1.9)
BILIRUB SERPL-MCNC: 0.4 MG/DL (ref 0.1–1)
BUN SERPL-MCNC: 22 MG/DL (ref 8–23)
CALCIUM SERPL-MCNC: 9.1 MG/DL (ref 8.7–10.5)
CHLORIDE SERPL-SCNC: 97 MMOL/L (ref 95–110)
CO2 SERPL-SCNC: 28 MMOL/L (ref 23–29)
CREAT SERPL-MCNC: 0.9 MG/DL (ref 0.5–1.4)
DIFFERENTIAL METHOD: ABNORMAL
EOSINOPHIL # BLD AUTO: 0 K/UL (ref 0–0.5)
EOSINOPHIL NFR BLD: 0.3 % (ref 0–8)
ERYTHROCYTE [DISTWIDTH] IN BLOOD BY AUTOMATED COUNT: 11.8 % (ref 11.5–14.5)
EST. GFR  (AFRICAN AMERICAN): >60 ML/MIN/1.73 M^2
EST. GFR  (NON AFRICAN AMERICAN): >60 ML/MIN/1.73 M^2
GLUCOSE SERPL-MCNC: 293 MG/DL (ref 70–110)
HCT VFR BLD AUTO: 36.7 % (ref 40–54)
HGB BLD-MCNC: 12.4 G/DL (ref 14–18)
IMM GRANULOCYTES # BLD AUTO: 0.11 K/UL (ref 0–0.04)
IMM GRANULOCYTES NFR BLD AUTO: 1.7 % (ref 0–0.5)
LYMPHOCYTES # BLD AUTO: 1.2 K/UL (ref 1–4.8)
LYMPHOCYTES NFR BLD: 17.4 % (ref 18–48)
MAGNESIUM SERPL-MCNC: 1.4 MG/DL (ref 1.6–2.6)
MCH RBC QN AUTO: 32.1 PG (ref 27–31)
MCHC RBC AUTO-ENTMCNC: 33.8 G/DL (ref 32–36)
MCV RBC AUTO: 95 FL (ref 82–98)
MONOCYTES # BLD AUTO: 0.6 K/UL (ref 0.3–1)
MONOCYTES NFR BLD: 9.2 % (ref 4–15)
NEUTROPHILS # BLD AUTO: 4.7 K/UL (ref 1.8–7.7)
NEUTROPHILS NFR BLD: 71.1 % (ref 38–73)
NRBC BLD-RTO: 0 /100 WBC
PHOSPHATE SERPL-MCNC: 4.5 MG/DL (ref 2.7–4.5)
PLATELET # BLD AUTO: 358 K/UL (ref 150–350)
PMV BLD AUTO: 10.5 FL (ref 9.2–12.9)
POCT GLUCOSE: 279 MG/DL (ref 70–110)
POCT GLUCOSE: 296 MG/DL (ref 70–110)
POTASSIUM SERPL-SCNC: 4.5 MMOL/L (ref 3.5–5.1)
PROT SERPL-MCNC: 7 G/DL (ref 6–8.4)
RBC # BLD AUTO: 3.86 M/UL (ref 4.6–6.2)
SODIUM SERPL-SCNC: 136 MMOL/L (ref 136–145)
WBC # BLD AUTO: 6.61 K/UL (ref 3.9–12.7)

## 2020-11-24 PROCEDURE — 25000003 PHARM REV CODE 250: Performed by: INTERNAL MEDICINE

## 2020-11-24 PROCEDURE — 94761 N-INVAS EAR/PLS OXIMETRY MLT: CPT

## 2020-11-24 PROCEDURE — 97116 GAIT TRAINING THERAPY: CPT

## 2020-11-24 PROCEDURE — 83735 ASSAY OF MAGNESIUM: CPT

## 2020-11-24 PROCEDURE — 99900035 HC TECH TIME PER 15 MIN (STAT)

## 2020-11-24 PROCEDURE — 63600175 PHARM REV CODE 636 W HCPCS: Performed by: NURSE PRACTITIONER

## 2020-11-24 PROCEDURE — 85025 COMPLETE CBC W/AUTO DIFF WBC: CPT

## 2020-11-24 PROCEDURE — 94640 AIRWAY INHALATION TREATMENT: CPT

## 2020-11-24 PROCEDURE — 97161 PT EVAL LOW COMPLEX 20 MIN: CPT

## 2020-11-24 PROCEDURE — 27000221 HC OXYGEN, UP TO 24 HOURS

## 2020-11-24 PROCEDURE — 84100 ASSAY OF PHOSPHORUS: CPT

## 2020-11-24 PROCEDURE — 63600175 PHARM REV CODE 636 W HCPCS: Performed by: INTERNAL MEDICINE

## 2020-11-24 PROCEDURE — 36415 COLL VENOUS BLD VENIPUNCTURE: CPT

## 2020-11-24 PROCEDURE — 97167 OT EVAL HIGH COMPLEX 60 MIN: CPT

## 2020-11-24 PROCEDURE — 99900031 HC PATIENT EDUCATION (STAT)

## 2020-11-24 PROCEDURE — 80053 COMPREHEN METABOLIC PANEL: CPT

## 2020-11-24 PROCEDURE — 97530 THERAPEUTIC ACTIVITIES: CPT

## 2020-11-24 RX ORDER — ZINC SULFATE 50(220)MG
220 CAPSULE ORAL DAILY
Qty: 30 CAPSULE | Refills: 0 | Status: SHIPPED | OUTPATIENT
Start: 2020-11-25

## 2020-11-24 RX ORDER — DEXAMETHASONE 4 MG/1
4 TABLET ORAL DAILY
Qty: 2 TABLET | Refills: 0 | Status: SHIPPED | OUTPATIENT
Start: 2020-11-25

## 2020-11-24 RX ORDER — MAGNESIUM SULFATE HEPTAHYDRATE 40 MG/ML
2 INJECTION, SOLUTION INTRAVENOUS ONCE
Status: COMPLETED | OUTPATIENT
Start: 2020-11-24 | End: 2020-11-24

## 2020-11-24 RX ADMIN — Medication 1000 UNITS: at 08:11

## 2020-11-24 RX ADMIN — THERA TABS 1 TABLET: TAB at 08:11

## 2020-11-24 RX ADMIN — ALLOPURINOL 300 MG: 300 TABLET ORAL at 08:11

## 2020-11-24 RX ADMIN — MAGNESIUM SULFATE 2 G: 2 INJECTION INTRAVENOUS at 12:11

## 2020-11-24 RX ADMIN — OXYCODONE HYDROCHLORIDE AND ACETAMINOPHEN 500 MG: 500 TABLET ORAL at 08:11

## 2020-11-24 RX ADMIN — ALBUTEROL SULFATE 2 PUFF: 90 AEROSOL, METERED RESPIRATORY (INHALATION) at 07:11

## 2020-11-24 RX ADMIN — ZINC SULFATE 220 MG (50 MG) CAPSULE 220 MG: CAPSULE at 08:11

## 2020-11-24 RX ADMIN — CYANOCOBALAMIN TAB 250 MCG 1000 MCG: 250 TAB at 08:11

## 2020-11-24 RX ADMIN — ALBUTEROL SULFATE 2 PUFF: 90 AEROSOL, METERED RESPIRATORY (INHALATION) at 01:11

## 2020-11-24 RX ADMIN — CELECOXIB 200 MG: 100 CAPSULE ORAL at 08:11

## 2020-11-24 RX ADMIN — INSULIN ASPART 6 UNITS: 100 INJECTION, SOLUTION INTRAVENOUS; SUBCUTANEOUS at 11:11

## 2020-11-24 RX ADMIN — DEXAMETHASONE 6 MG: 4 TABLET ORAL at 08:11

## 2020-11-24 RX ADMIN — INSULIN ASPART 6 UNITS: 100 INJECTION, SOLUTION INTRAVENOUS; SUBCUTANEOUS at 05:11

## 2020-11-24 RX ADMIN — INSULIN DETEMIR 15 UNITS: 100 INJECTION, SOLUTION SUBCUTANEOUS at 08:11

## 2020-11-24 NOTE — PLAN OF CARE
Patient continues to be monitored and treated. Patient remains on heart monitor. He continues to run sinus kali, primarily in the low 40's. Patient remains asymptomatic of cardiac complications. Patient is on 2 L via nasal cannula. Breath sounds remain diminished. Patient reports coughing up light green mucous. Patient continues to ambulate independently. Patient is on AC/HS blood sugar checks with insulin coverage above 150. Patient on lovenox for VTE. Remdesivir consult in place. Will continue to monitor and treat.

## 2020-11-24 NOTE — PT/OT/SLP EVAL
Occupational Therapy   Evaluation and Discharge Note    Name: Vincenzo Kruse  MRN: [id number]  Admitting Diagnosis:  Pneumonia due to COVID-19 virus      Recommendations:     Discharge Recommendations: home  Discharge Equipment Recommendations:  none  Barriers to discharge:       Assessment:     Vincenzo Kruse is a 60 y.o. male with a medical diagnosis of Pneumonia due to COVID-19 virus. At this time, patient is functioning at their prior level of function and does not require further acute OT services.     Plan:     During this hospitalization, patient does not require further acute OT services.  Please re-consult if situation changes.    · Plan of Care Reviewed with: patient, family    Subjective     Chief Complaint:   Patient/Family Comments/goals:     Occupational Profile:  Living Environment: LIVES WITH SPOUSE IN 1 STORY HOUSE NO STEPS  Previous level of function:(I) WITH ADL'S AND FUNCTIONAL MOBILITY  Roles and Routines: OCCUPATIONAL THERAPY  Equipment Used at home:  none  Assistance upon Discharge:    Pain/Comfort:  · Pain Rating 1: 0/10    Patients cultural, spiritual, Jehovah's witness conflicts given the current situation:      Objective:     Communicated with: NURSE AND Epic CHART REVIEW prior to session.  Patient found HOB elevated with peripheral IV, telemetry upon OT entry to room.    General Precautions: Standard, airborne, contact, droplet, fall   Orthopedic Precautions:N/A   Braces: N/A     Occupational Performance:    Bed Mobility:    · Patient completed Rolling/Turning to Left with  independence  · Patient completed Rolling/Turning to Right with independence  · Patient completed Scooting/Bridging with independence  · Patient completed Supine to Sit with independence  · Patient completed Sit to Supine with independence    Functional Mobility/Transfers:  · Patient completed Sit <> Stand Transfer with independence  with  no assistive device   · Functional Mobility: S TO (I) WITH FUNCTIONAL MOBILITY X 60 FEET  WITH NO AE    Activities of Daily Living:  · Upper Body Dressing: supervision .  · Lower Body Dressing: supervision .  · Toileting: supervision .    Cognitive/Visual Perceptual:  Cognitive/Psychosocial Skills:     -       Oriented to: Person, Place, Time and Situation   -       Follows Commands/attention:Follows multistep  commands  -       Communication: clear/fluent  -       Memory: No Deficits noted  -       Safety awareness/insight to disability: intact   Visual/Perceptual:      -Intact .    Physical Exam:  Upper Extremity Range of Motion:     -       Right Upper Extremity: WFL  -       Left Upper Extremity: WFL  Upper Extremity Strength:    -       Right Upper Extremity: WFL  -       Left Upper Extremity: WFL   Strength:    -       Right Upper Extremity: WFL  -       Left Upper Extremity: WFL    AMPAC 6 Click ADL:  AMPAC Total Score: 24    Treatment & Education:      Education:    Patient left HOB elevated with all lines intact, call button in reach, NURSE notified and SON present    GOALS:   Multidisciplinary Problems     Occupational Therapy Goals     Not on file                History:     Past Medical History:   Diagnosis Date    Diabetes mellitus     High cholesterol     Hypertension        History reviewed. No pertinent surgical history.    Time Tracking:     OT Date of Treatment: 11/24/20  OT Start Time: 1155  OT Stop Time: 1220  OT Total Time (min): 25 min    Billable Minutes:Evaluation 10 MINUTES  Therapeutic Activity 15 MINUTES    Karla Erazo OT  11/24/2020

## 2020-11-24 NOTE — PLAN OF CARE
11/24/20 1503   Post-Acute Status   Post-Acute Authorization HME   E Status Set-up Complete   Discharge Plan   Discharge Plan A Home with family   Discharge Plan B Home with family       Beni Belle LMSW 11/24/2020 3:03 PM

## 2020-11-24 NOTE — CONSULTS
Pt approved for 1 tank of oxygen. Oxygen delivered to nurses station according to Julia Amor.    Beni Belle LMSW 11/24/2020 4:05 PM

## 2020-11-24 NOTE — PROGRESS NOTES
Home Oxygen Evaluation    Date Performed: 2020    1) Patient's Home O2 Sat on room air, while at rest: 93%        If O2 sats on room air at rest are 88% or below, patient qualifies. No additional testing needed. Document N/A in steps 2 and 3. If 89% or above, complete steps 2.      2) Patient's O2 Sat on room air while exercisin%        If O2 sats on room air while exercising remain 89% or above patient does not qualify, no further testing needed Document N/A in step 3. If O2 sats on room air while exercising are 88% or below, continue to step 3.      3) Patient's O2 Sat while exercising on O2: 96% at 2 LPM         (Must show improvement from #2 for patients to qualify)    If O2 sats improve on oxygen, patient qualifies for portable oxygen. If not, the patient does not qualify.

## 2020-11-24 NOTE — NURSING
Justin, NP, informed of patient's heart rate primarily staying at 43 bpm, having dropped to 38bpm 4 times so far this shift. Patient remains asymptomatic. Requested cardiac alarm parameters be lowered. Per Justin, parameters dropped to 43 bpm.

## 2020-11-24 NOTE — NURSING
Discharge instructions reviewed with patient and son, both verbalized understanding.  PIV removed, catheter intact.  Tele box removed and returned to monitor room.  Stressed importance to keep and attend all follow up appointments.  Educated patient on the need to make prescribed medication meds.  Medications delivered to bedside from pharmacy. Pt waiting for O2 to be delivered before discharge.

## 2020-11-24 NOTE — DISCHARGE INSTRUCTIONS
Your test was POSITIVE for COVID-19 (coronavirus).       Please isolate yourself at home.  You may leave home and/or return to work once the following conditions are met:    If you were not hospitalized and are not severely immunocompromised*:   More than 10 days since symptoms first appeared AND   More than 24 hours fever free without medications AND   Symptoms have improved     If you were hospitalized OR are severely immunocompromised*:   More than 20 days since symptoms first appeared   More than 24 hours fever free without medications   Symptoms have improved    If you had no symptoms but tested positive:   More than 10 days since the date of the first positive test (20 days if severely immunocompromised).   If you develop symptoms, then use the guidelines above.     *Definition of severely immunocompromised:  - Current chemotherapy for cancer  - Untreated HIV with CD4 count less than 200  - Combined primary immunodeficiency disorder  - Prednisone more than 20 mg per day for more than 14 days  - Post-transplant patients    Additional instructions:   Separate yourself from other people and animals in your home.   Call ahead before visiting your doctor.   Wear a facemask when around others.   Cover your coughs and sneezes.   Wash your hands often with soap and water; hand  can be used, too.   Avoid sharing personal household items.   Wipe down surfaces used daily.   Monitor your symptoms. Seek prompt medical attention if your illness is worsening (e.g., difficulty breathing).    Before seeking care, call your healthcare provider.   If you have a medical emergency and need to call 911, notify the dispatch personnel that you have, or are being evaluated for COVID-19. If possible, put on a facemask before emergency medical services arrive.        Contact Tracing    As one of the next steps, you will receive a call or text from the [place] [facility] ([facility]) COVID-19 Tracing Team.  See the contact information below so you know not to ignore the health departments call. It is important that you contact them back immediately so they can help.      Contact Tracer Number:  [phone]  Caller ID for most carriers: [place] Dept [facility]     What is contact tracing?  · Contact tracing is a process that helps identify everyone who has been in close contact with an infected person. Contact tracers let those people know they may have been exposed and guide them on next steps. Confidentiality is important for everyone; no one will be told who may have exposed them to the virus.  · Your involvement is important. The more we know about where and how this virus is spreading, the better chance we have at stopping it from spreading further.  What does exposure mean?  · Exposure means you have been within 6 feet for more than 15 minutes with a person who has or had COVID-19.  What kind of questions do the contact tracers ask?  · A contact tracer will confirm your basic contact information including name, address, phone number, and next of kin, as well as asking about any symptoms you may have had. Theyll also ask you how you think you may have gotten sick, such as places where you may have been exposed to the virus, and people you were with. Those names will never be shared with anyone outside of that call, and will only be used to help trace and stop the spread of the virus.   I have privacy concerns. How will the state use my information?  · Your privacy about your health is important. All calls are completed using call centers that use the appropriate health privacy protection measures (HIPAA compliance), meaning that your patient information is safe. No one will ever ask you any questions related to immigration status. Your health comes first.   Do I have to participate?  · You do not have to participate, but we strongly encourage you to. Contact tracing can help us catch and control new outbreaks as  theyre developing to keep your friends and family safe.   What if I dont hear from anyone?  · If you dont receive a call within 24 hours, you can call the number above right away to inquire about your status. That line is open from 8:00 am - 8:00 p.m., 7 days a week.  Contact tracing saves lives! Together, we have the power to beat this virus and keep our loved ones and neighbors safe.    For more information see CDC link below.      https://www.cdc.gov/coronavirus/2019-ncov/hcp/guidance-prevent-spread.html#precautions        Sources:  CDC, [place] [facility] and [facility]           Sincerely,     Jerod Martinez NP

## 2020-11-24 NOTE — PT/OT/SLP EVAL
Physical Therapy Evaluation and Discharge Note    Patient Name:  Vincenzo Kruse   MRN:  [id number]    Recommendations:     Discharge Recommendations:  home   Discharge Equipment Recommendations:     Barriers to discharge: None    Assessment:     Vincenzo Kruse is a 60 y.o. male admitted with a medical diagnosis of Pneumonia due to COVID-19 virus. .  At this time, patient is functioning at their prior level of function and does not require further acute PT services.     Recent Surgery: * No surgery found *      Plan:     During this hospitalization, patient does not require further acute PT services.  Please re-consult if situation changes.      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  Equipment used at home: none.  DME owned (not currently used): none.  Upon discharge, patient will have assistance from FAMILY.    Objective:     Communicated with NURSE GREY AND [facility] CHART REVIEW  prior to session.  Patient found supine with telemetry, peripheral IV upon PT entry to room.    General Precautions: Standard,     Orthopedic Precautions:N/A   Braces: N/A     Exams:  · RLE ROM: WNL  · RLE Strength: WNL  · LLE ROM: WNL  · LLE Strength: WNL    Functional Mobility:  PT MET IN RM SUP>SIT EOB IND. PT STOOD WITH NO AD AND GT TRAINED X 100' IN ROOM IND. PT LIMITED WITH GT D/T PRECAUTIONS AND NOT BEING ABLE TO LEAVE ROOM. PT RETURNED SEATED EOB IND AND LEFT SEATED WITH ALL NEEDS MET . PT IND AND WILL BE D/C TO MOBILITY PROGRAM FOR WALKING.     AM-PAC 6 CLICK MOBILITY  Total Score:21     Patient left SEATED EOB with call button in reach.    GOALS:    Multidisciplinary Problems     Physical Therapy Goals     Not on file                History:     Past Medical History:   Diagnosis Date    Diabetes mellitus     High cholesterol     Hypertension        History reviewed. No pertinent surgical history.    Time Tracking:     PT Received On: 11/24/20  PT Start Time: 1130     PT Stop Time: 1155  PT Total Time (min): 25 min     Billable Minutes: Evaluation 15 and Gait Training 10      Phuong Rashid, PT  11/24/2020

## 2020-11-24 NOTE — PLAN OF CARE
PT IS IND WITH [facility] MOBILITY AND WILL BE D.C FROM P.T. TO MOBILITY PROGRAM

## 2020-11-25 ENCOUNTER — PATIENT MESSAGE (OUTPATIENT)
Dept: ADMINISTRATIVE | Facility: OTHER | Age: 60
End: 2020-11-25

## 2020-11-25 ENCOUNTER — NURSE TRIAGE (OUTPATIENT)
Dept: ADMINISTRATIVE | Facility: CLINIC | Age: 60
End: 2020-11-25

## 2020-11-25 ENCOUNTER — PATIENT OUTREACH (OUTPATIENT)
Dept: ADMINISTRATIVE | Facility: CLINIC | Age: 60
End: 2020-11-25

## 2020-11-25 NOTE — PROGRESS NOTES
C3 nurse attempted to contact patient. The following occurred:   C3 nurse attempted to contact amenorrhea  for a TCC post hospital discharge follow up call. The patient is unable to conduct the call @ this time. The patient requested a callback.    The patient has a scheduled HOSFU appointment with Dr Michael Pineda on 12/2 @ 1020am. Message sent to Physician staff.

## 2020-11-25 NOTE — TELEPHONE ENCOUNTER
Contacted pt for enrollment in Covid Surveillance program. Pt verified name and . Pt has smartphone and got J[employer]anc[employer] downloaded. Pt has pulse ox. Verified contact and emergency contact info. Went over how to use pulse ox and ED precautions. Advised if SpO2 ever falls below 94% and does not increase after a few deep breaths to go to ED.  All VS and symptoms WNL so no further triage required at this time. Transferred to  to finish enrollment process.     Sp02: 94    Pulse: 61    Temperature: 98.2    SOB at rest (0-5): 0    SOB with activity (0-5): 0    Worsening symptoms: no    Fever symptoms: no    Increased home oxygen: no    Reason for Disposition   Information only question and nurse able to answer    Protocols used: INFORMATION ONLY CALL - NO TRIAGE-A-[place]

## 2020-11-25 NOTE — PLAN OF CARE
11/25/20 0832   Final Note   Assessment Type Final Discharge Note   Anticipated Discharge Disposition Home   Right Care Referral Info   Post Acute Recommendation Home-care   Post-Acute Status   Post-Acute Authorization HME   HME Status Set-up Complete       Beni Belle LMSW 11/25/2020 8:33 AM

## 2020-11-25 NOTE — DISCHARGE SUMMARY
[facility] Hospital Medicine  Discharge Summary      Patient Name: Vincenzo Kruse  MRN: [id number]  Admission Date: 11/21/2020  Hospital Length of Stay: 2 days  Discharge Date and Time: 11/24/2020  4:45 PM  Attending Physician: Nino Mackey MD   Discharging Provider: Jerod Martinez NP  Primary Care Provider: Primary Doctor No      HPI:   Mr. Kruse is a 61 yo male with a PMHx of HTN, HLD, DM II, and obesity, who presented to the ED with c/o SOB that has progressively worsened over the past 1 week. Associated nonproductive cough, fever and chills. Symptoms are constant and moderate in severity. No mitigating or exacerbating factors reported. Patient was dx with COVID 3 days ago, and has been taking prednisone, and using a home pulse ox. Today, he was having trouble speaking, and had sats in the 70's at home. Patient denies any CP, palpitations, wheezing, congestion, orthopnea, edema, ABD pain, N/V/D, dysuria, hematuria, back pain, HA, lightheadedness, dizziness, syncope, or weakness. Upon arrival in the ED, patient was hypoxic with O2 sat 88% on RA. He was placed on 2L NC, which improved O2 sat to >95%. Initial work-up showed: Normal WBC, lactic 2.3, glucose 336, normal kidney function, AST 79, , negative troponin, , CPK 68, ferritin 4106, DDimer 0.69, , COVID-19 positive. ABG with pH 7.451, pCO2 33.9, pO2 60, SaO2 92 on RA. CXR showed reticular and ground-glass opacities in the lung bases persist, slightly lower lung volumes on the current study from previous, negative for new pulmonary opacities, findings continue to indicate atypical viral pneumonia such as COVID-19 infection. IV dexamethasone 6 mg given in the ED. Hospital Medicine was contacted for admission and patient placed in Observation for COVID-19 PNA.  Patient is a Full Code. His wife is SDM.      * No surgery found *      Hospital Course:   Mr Kruse is a 60 year old male who presented to [facility] Emergency Room  with increase shortness of breath over the pass week. Associated symptoms include cough, fever and chills. He has been diagnosis with COVID-19. Patient reports symptoms symptoms improved with oxygen. He has now been started on Remdesivir. As of 11/23, vital signs and stable. He experience some asymptomatic bradycardia in the 40-50's, troponin negative. Glucose elevated due to steroids. Today patient feeling well, vital signs and labs stable. Patient qualify for home oxygen, COVID 19 surveillance order for outpatient. Patient was seen, examined and deemed suitable for discharge.          Consults:   Consults (From admission, onward)        Status Ordering Provider     Inpatient consult to Social Work  Once     Provider:  (Not yet assigned)    Completed MICKI BURNHAM     Pharmacy Remdesivir Consult  Once     Provider:  (Not yet assigned)    Acknowledged MICKI BURNHAM          No new Assessment & Plan notes have been filed under this hospital service since the last note was generated.  Service: Hospital Medicine    Final Active Diagnoses:    Diagnosis Date Noted POA    PRINCIPAL PROBLEM:  Acute respiratory failure with hypoxia secondary to pneumonia due to COVID-19 virus [U07.1, J12.89] 11/21/2020 Yes    Bradycardia [R00.1] 11/23/2020 Yes    Type 2 diabetes mellitus with hyperglycemia, without long-term current use of insulin [E11.65] 11/21/2020 Yes     Chronic    Essential hypertension [I10] 11/21/2020 Yes     Chronic    Hyperlipidemia [E78.5] 10/02/2015 Yes     Chronic      Problems Resolved During this Admission:    Diagnosis Date Noted Date Resolved POA    Transaminitis [R74.01] 11/22/2020 11/24/2020 Yes       Discharged Condition: stable    Disposition: Home or Self Care    Follow Up:  Follow-up Information     Michael Pineda DO. Schedule an appointment as soon as possible for a visit in 2 days.    Specialty: Family Medicine  Why: hospital follow up for COVID 19   Contact information:  [id number]  [facility] [address]  [place] [address]  [phone]                 Patient Instructions:      OXYGEN FOR HOME USE     Order Specific Question Answer Comments   Liter Flow 2    Duration Continuous    Qualifying SpO2: 87    Testing done at: Exercise/Activity    Route nasal cannula    Device home concentrator with portable unit    Length of need (in months): 3 mos    Patient condition with qualifying saturation  COVID 19   Height: 6' (1.829 m)    Weight: 112 kg (246 lb 14.6 oz)    Does patient have medical equipment at home? none    Alternative treatment measures have been tried or considered and deemed clinically ineffective. Yes    Vendor: [facility]    Expected Date of Delivery: 11/24/2020      COVID-19 Surveillance Program     Order Specific Question Answer Comments   Does patient have a smartphone? Yes    Does patient have the [facility] on their smartphone? No    While in surveillance program, will patient be using home oxygen? Yes      Notify your health care provider if you experience any of the following:  temperature >100.4     Notify your health care provider if you experience any of the following:  difficulty breathing or increased cough     Activity as tolerated       Significant Diagnostic Studies: Labs:   CMP   Recent Labs   Lab 11/23/20  0636 11/24/20  0604    136   K 4.5 4.5    97   CO2 26 28   * 293*   BUN 23 22   CREATININE 0.9 0.9   CALCIUM 9.2 9.1   PROT 7.1 7.0   ALBUMIN 2.4* 2.6*   BILITOT 0.4 0.4   ALKPHOS 96 93   AST 41* 32   ALT 85* 70*   ANIONGAP 9 11   ESTGFRAFRICA >60 >60   EGFRNONAA >60 >60    and CBC   Recent Labs   Lab 11/23/20  0636 11/24/20  0604   WBC 7.10 6.61   HGB 11.8* 12.4*   HCT 35.4* 36.7*   * 358*       Pending Diagnostic Studies:     None         Medications:  Reconciled Home Medications:      Medication List      START taking these medications    dexAMETHasone 4 MG Tab  Commonly known as: DECADRON  Take 1 tablet (4 mg total) by mouth once  daily.  Start taking on: November 25, 2020     multivitamin Tab  Take 1 tablet by mouth once daily.  Start taking on: November 25, 2020     zinc sulfate 220 (50) mg capsule  Commonly known as: ZINCATE  Take 1 capsule (220 mg total) by mouth once daily.  Start taking on: November 25, 2020        CHANGE how you take these medications    * pulse oximeter device  Commonly known as: pulse oximeter  Use twice daily at 8 AM and 3 PM and record the value in [employer] as directed.  What changed: Another medication with the same name was added. Make sure you understand how and when to take each.     * pulse oximeter device  Commonly known as: pulse oximeter  by Apply Externally route 2 (two) times a day. Use twice daily at 8 AM and 3 PM and record the value in MyChart as directed.  What changed: You were already taking a medication with the same name, and this prescription was added. Make sure you understand how and when to take each.         * This list has 2 medication(s) that are the same as other medications prescribed for you. Read the directions carefully, and ask your doctor or other care provider to review them with you.            CONTINUE taking these medications    allopurinoL 300 MG tablet  Commonly known as: ZYLOPRIM  Take 300 mg by mouth.     ALPRAZolam 0.25 MG tablet  Commonly known as: XANAX  Take 0.25 mg by mouth.     atorvastatin 20 MG tablet  Commonly known as: LIPITOR  Take 20 mg by mouth.     celecoxib 200 MG capsule  Commonly known as: CeleBREX  Take 200 mg by mouth.     cyanocobalamin 1000 MCG tablet  Commonly known as: VITAMIN B-12  Take 1,000 mcg by mouth.     metFORMIN 1000 MG tablet  Commonly known as: GLUCOPHAGE  Take 1,000 mg by mouth.     zolpidem 10 mg Tab  Commonly known as: AMBIEN  Take 10 mg by mouth.        STOP taking these medications    predniSONE 50 MG Tab  Commonly known as: DELTASONE            Indwelling Lines/Drains at time of discharge:   Lines/Drains/Airways     None                  Time spent on the discharge of patient: 45 minutes  Patient was seen and examined on the date of discharge and determined to be suitable for discharge.         Jerod Martinez NP  Department of Hospital Medicine  [facility]

## 2020-11-26 ENCOUNTER — NURSE TRIAGE (OUTPATIENT)
Dept: ADMINISTRATIVE | Facility: CLINIC | Age: 60
End: 2020-11-26

## 2020-11-26 ENCOUNTER — PATIENT MESSAGE (OUTPATIENT)
Dept: ADMINISTRATIVE | Facility: OTHER | Age: 60
End: 2020-11-26

## 2020-11-26 ENCOUNTER — TELEPHONE (OUTPATIENT)
Dept: ADMINISTRATIVE | Facility: OTHER | Age: 60
End: 2020-11-26

## 2020-11-26 NOTE — TELEPHONE ENCOUNTER
"O2 97%, HR 63, T 98.3, Fever N, Worse symptoms N, Increased home oxygen N, SOB at rest 2/ activity 2     Covid-19 surveillance program escalation due to abnormal entry/response after 8 am push. Follow up call attempted, no contact made. Left VM message. Call to EC, patient's wife states "No, he does not have any trouble when he is just sitting down. Only when he is moving around." Will change SOB at rest to 0/with activity remains 2. With that, VS are WNL. No need for further triage at this time.      Reason for Disposition   [1] COVID-19 diagnosed by positive lab test AND [2] mild symptoms (e.g., cough, fever, others) AND [3] no complications or SOB    Additional Information   Negative: SEVERE difficulty breathing (e.g., struggling for each breath, speaks in single words)   Negative: Difficult to awaken or acting confused (e.g., disoriented, slurred speech)   Negative: Bluish (or gray) lips or face now   Negative: Shock suspected (e.g., cold/pale/clammy skin, too weak to stand, low BP, rapid pulse)   Negative: Sounds like a life-threatening emergency to the triager   Negative: [1] COVID-19 exposure AND [2] no symptoms   Negative: [1] Lives with someone known to have influenza (flu test positive) AND [2] flu-like symptoms (e.g., cough, runny nose, sore throat, SOB; with or without fever)   Negative: [1] Adult with possible COVID-19 symptoms AND [2] triager concerned about severity of symptoms or other causes   Negative: Immunization reaction suspected (e.g., fever, headache, muscle aches occurring during days 1-3 days after immunization)   Negative: COVID-19 and breastfeeding, questions about   Negative: SEVERE or constant chest pain or pressure (Exception: mild central chest pain, present only when coughing)   Negative: MODERATE difficulty breathing (e.g., speaks in phrases, SOB even at rest, pulse 100-120)   Negative: [1] Headache AND [2] stiff neck (can't touch chin to chest)   Negative: MILD " difficulty breathing (e.g., minimal/no SOB at rest, SOB with walking, pulse <100)   Negative: Chest pain or pressure   Negative: Patient sounds very sick or weak to the triager   Negative: Fever > 103 F (39.4 C)   Negative: [1] Fever > 101 F (38.3 C) AND [2] age > 60   Negative: [1] Fever > 100.0 F (37.8 C) AND [2] bedridden (e.g., nursing home patient, CVA, chronic illness, recovering from surgery)   Negative: [1] HIGH RISK patient (e.g., age > 64 years, diabetes, heart or lung disease, weak immune system) AND [2] new or worsening symptoms   Negative: [1] HIGH RISK patient AND [2] influenza is widespread in the community AND [3] ONE OR MORE respiratory symptoms: cough, sore throat, runny or stuffy nose   Negative: [1] HIGH RISK patient AND [2] influenza exposure within the last 7 days AND [3] ONE OR MORE respiratory symptoms: cough, sore throat, runny or stuffy nose   Negative: Fever present > 3 days (72 hours)   Negative: [1] Fever returns after gone for over 24 hours AND [2] symptoms worse or not improved   Negative: [1] Continuous (nonstop) coughing interferes with work or school AND [2] no improvement using cough treatment per protocol   Negative: [1] COVID-19 infection suspected by caller or triager AND [2] mild symptoms (cough, fever, or others) AND [3] no complications or SOB   Negative: Cough present > 3 weeks    Protocols used: CORONAVIRUS (COVID-19) DIAGNOSED OR [id number]

## 2020-11-27 ENCOUNTER — PATIENT MESSAGE (OUTPATIENT)
Dept: ADMINISTRATIVE | Facility: OTHER | Age: 60
End: 2020-11-27

## 2020-11-27 LAB
BACTERIA BLD CULT: NORMAL
BACTERIA BLD CULT: NORMAL

## 2020-11-28 ENCOUNTER — PATIENT MESSAGE (OUTPATIENT)
Dept: ADMINISTRATIVE | Facility: OTHER | Age: 60
End: 2020-11-28

## 2020-11-29 ENCOUNTER — PATIENT MESSAGE (OUTPATIENT)
Dept: ADMINISTRATIVE | Facility: OTHER | Age: 60
End: 2020-11-29

## 2020-11-30 ENCOUNTER — PATIENT MESSAGE (OUTPATIENT)
Dept: ADMINISTRATIVE | Facility: OTHER | Age: 60
End: 2020-11-30

## 2020-11-30 ENCOUNTER — NURSE TRIAGE (OUTPATIENT)
Dept: ADMINISTRATIVE | Facility: CLINIC | Age: 60
End: 2020-11-30

## 2020-11-30 NOTE — TELEPHONE ENCOUNTER
Patient initially escalated due to no response, however patient entered vitals prior to phone call. Vital signs and symptoms did not trigger a second escalation; therefore, no follow up call is needed at this time.

## 2020-12-01 ENCOUNTER — PATIENT MESSAGE (OUTPATIENT)
Dept: ADMINISTRATIVE | Facility: OTHER | Age: 60
End: 2020-12-01

## 2020-12-01 ENCOUNTER — NURSE TRIAGE (OUTPATIENT)
Dept: ADMINISTRATIVE | Facility: CLINIC | Age: 60
End: 2020-12-01

## 2020-12-02 ENCOUNTER — PATIENT MESSAGE (OUTPATIENT)
Dept: ADMINISTRATIVE | Facility: OTHER | Age: 60
End: 2020-12-02

## 2020-12-02 ENCOUNTER — NURSE TRIAGE (OUTPATIENT)
Dept: ADMINISTRATIVE | Facility: CLINIC | Age: 60
End: 2020-12-02

## 2020-12-03 ENCOUNTER — PATIENT MESSAGE (OUTPATIENT)
Dept: ADMINISTRATIVE | Facility: OTHER | Age: 60
End: 2020-12-03

## 2020-12-04 ENCOUNTER — NURSE TRIAGE (OUTPATIENT)
Dept: ADMINISTRATIVE | Facility: CLINIC | Age: 60
End: 2020-12-04

## 2020-12-04 ENCOUNTER — PATIENT MESSAGE (OUTPATIENT)
Dept: ADMINISTRATIVE | Facility: OTHER | Age: 60
End: 2020-12-04

## 2020-12-05 ENCOUNTER — PATIENT MESSAGE (OUTPATIENT)
Dept: ADMINISTRATIVE | Facility: OTHER | Age: 60
End: 2020-12-05

## 2020-12-05 ENCOUNTER — NURSE TRIAGE (OUTPATIENT)
Dept: ADMINISTRATIVE | Facility: CLINIC | Age: 60
End: 2020-12-05

## 2020-12-05 NOTE — TELEPHONE ENCOUNTER
Patient initially escalated due to no response, however patient entered vitals prior to phone call. Vital signs and symptoms did not trigger a second escalation; therefore, no follow up call is needed at this time.    Sp02: 97  Pulse: 82  Temperature: 98.4  SOB at rest (0-5): 0  SOB with activity (0-5): 0  Worsening symptoms: no  Fever symptoms: no  Increased home oxygen: n/a      Reason for Disposition   Caller has cancelled the call before the first contact    Protocols used: NO CONTACT OR DUPLICATE CONTACT CALL-A-AH

## 2020-12-06 ENCOUNTER — PATIENT MESSAGE (OUTPATIENT)
Dept: ADMINISTRATIVE | Facility: OTHER | Age: 60
End: 2020-12-06

## 2020-12-07 ENCOUNTER — NURSE TRIAGE (OUTPATIENT)
Dept: ADMINISTRATIVE | Facility: CLINIC | Age: 60
End: 2020-12-07

## 2020-12-07 ENCOUNTER — PATIENT MESSAGE (OUTPATIENT)
Dept: ADMINISTRATIVE | Facility: OTHER | Age: 60
End: 2020-12-07

## 2020-12-08 ENCOUNTER — NURSE TRIAGE (OUTPATIENT)
Dept: ADMINISTRATIVE | Facility: CLINIC | Age: 60
End: 2020-12-08

## 2020-12-08 ENCOUNTER — PATIENT MESSAGE (OUTPATIENT)
Dept: ADMINISTRATIVE | Facility: OTHER | Age: 60
End: 2020-12-08

## 2020-12-08 NOTE — TELEPHONE ENCOUNTER
Pt states wishes to opt out of program. Home symptom tracking explained and offered. Pt declined. States he saw his provider today and he is doing well and adds he has not had to use O2 since 11/30. Advised to continue monitoring VS and symptoms and to call back with concerns or worsening condition. Verbalized understanding     Reason for Disposition   [1] Follow-up call to recent contact AND [2] information only call, no triage required    Additional Information   Negative: [1] Caller is not with the adult (patient) AND [2] reporting urgent symptoms   Negative: Lab result questions   Negative: Medication questions   Negative: Caller can't be reached by phone   Negative: Caller has already spoken to PCP or another triager   Negative: RN needs further essential information from caller in order to complete triage   Negative: Requesting regular office appointment   Negative: [1] Caller requesting NON-URGENT health information AND [2] PCP's office is the best resource    Protocols used: INFORMATION ONLY CALL - NO TRIAGE-A-

## 2020-12-09 ENCOUNTER — NURSE TRIAGE (OUTPATIENT)
Dept: ADMINISTRATIVE | Facility: CLINIC | Age: 60
End: 2020-12-09

## 2020-12-09 NOTE — TELEPHONE ENCOUNTER
Pt contacted through Covid Surveillance Program for No response escalation.  pT had asked to opt out yesterday and accidentally escalated due to failure to remove tasks. Tasks removed. No outreach made due to pt opting out.    Reason for Disposition   Caller has cancelled the call before the first contact    Additional Information   Negative: Caller has already spoken with the PCP (or office), and has no further questions   Negative: Caller has already spoken with another triager and has no further questions   Negative: Caller has already spoken with another triager or PCP (or office), and has further questions and triager able to answer questions.    Protocols used: NO CONTACT OR DUPLICATE CONTACT CALL-A-[place]

## 2022-10-20 ENCOUNTER — PATIENT MESSAGE (OUTPATIENT)
Dept: RESEARCH | Facility: HOSPITAL | Age: 62
End: 2022-10-20
Payer: COMMERCIAL

## 2022-11-15 ENCOUNTER — PATIENT MESSAGE (OUTPATIENT)
Dept: RESEARCH | Facility: HOSPITAL | Age: 62
End: 2022-11-15
Payer: COMMERCIAL

## 2023-05-03 ENCOUNTER — PATIENT MESSAGE (OUTPATIENT)
Dept: RESEARCH | Facility: HOSPITAL | Age: 63
End: 2023-05-03
Payer: COMMERCIAL